# Patient Record
Sex: MALE | Race: WHITE
[De-identification: names, ages, dates, MRNs, and addresses within clinical notes are randomized per-mention and may not be internally consistent; named-entity substitution may affect disease eponyms.]

---

## 2018-09-11 ENCOUNTER — HOSPITAL ENCOUNTER (INPATIENT)
Dept: HOSPITAL 4 - SED | Age: 78
LOS: 3 days | Discharge: NURSING, REHAB OR LONG-TERM CARE | DRG: 190 | End: 2018-09-14
Attending: FAMILY MEDICINE | Admitting: FAMILY MEDICINE
Payer: COMMERCIAL

## 2018-09-11 VITALS — HEIGHT: 68 IN | BODY MASS INDEX: 28.04 KG/M2 | WEIGHT: 185 LBS

## 2018-09-11 VITALS — SYSTOLIC BLOOD PRESSURE: 131 MMHG

## 2018-09-11 VITALS — SYSTOLIC BLOOD PRESSURE: 133 MMHG

## 2018-09-11 DIAGNOSIS — M19.90: ICD-10-CM

## 2018-09-11 DIAGNOSIS — J18.9: ICD-10-CM

## 2018-09-11 DIAGNOSIS — Z87.891: ICD-10-CM

## 2018-09-11 DIAGNOSIS — J44.0: Primary | ICD-10-CM

## 2018-09-11 DIAGNOSIS — Z86.73: ICD-10-CM

## 2018-09-11 DIAGNOSIS — K21.9: ICD-10-CM

## 2018-09-11 DIAGNOSIS — J44.1: ICD-10-CM

## 2018-09-11 DIAGNOSIS — I27.20: ICD-10-CM

## 2018-09-11 DIAGNOSIS — F03.90: ICD-10-CM

## 2018-09-11 LAB
ALBUMIN SERPL BCP-MCNC: 3.4 G/DL (ref 3.4–4.8)
ALT SERPL W P-5'-P-CCNC: 30 U/L (ref 12–78)
ANION GAP SERPL CALCULATED.3IONS-SCNC: 8 MMOL/L (ref 5–15)
AST SERPL W P-5'-P-CCNC: 27 U/L (ref 10–37)
BASOPHILS # BLD AUTO: 0.3 K/UL (ref 0–0.2)
BASOPHILS NFR BLD AUTO: 3.1 % (ref 0–2)
BILIRUB SERPL-MCNC: 1.1 MG/DL (ref 0–1)
BUN SERPL-MCNC: 17 MG/DL (ref 8–21)
CALCIUM SERPL-MCNC: 8.8 MG/DL (ref 8.4–11)
CHLORIDE SERPL-SCNC: 108 MMOL/L (ref 98–107)
CREAT SERPL-MCNC: 1.06 MG/DL (ref 0.55–1.3)
EOSINOPHIL # BLD AUTO: 0.2 K/UL (ref 0–0.4)
EOSINOPHIL NFR BLD AUTO: 2.6 % (ref 0–4)
ERYTHROCYTE [DISTWIDTH] IN BLOOD BY AUTOMATED COUNT: 14.4 % (ref 9–15)
GFR SERPL CREATININE-BSD FRML MDRD: (no result) ML/MIN (ref 90–?)
GLUCOSE SERPL-MCNC: 108 MG/DL (ref 70–99)
HCT VFR BLD AUTO: 42.2 % (ref 36–54)
HGB BLD-MCNC: 14 G/DL (ref 14–18)
LYMPHOCYTES # BLD AUTO: 1.4 K/UL (ref 1–5.5)
LYMPHOCYTES NFR BLD AUTO: 17.2 % (ref 20.5–51.5)
MCH RBC QN AUTO: 32 PG (ref 27–31)
MCHC RBC AUTO-ENTMCNC: 33 % (ref 32–36)
MCV RBC AUTO: 96 FL (ref 79–98)
MONOCYTES # BLD MANUAL: 0.7 K/UL (ref 0–1)
MONOCYTES # BLD MANUAL: 8.7 % (ref 1.7–9.3)
NEUTROPHILS # BLD AUTO: 5.5 K/UL (ref 1.8–7.7)
NEUTROPHILS NFR BLD AUTO: 68.4 % (ref 40–70)
PLATELET # BLD AUTO: 160 K/UL (ref 130–430)
POTASSIUM SERPL-SCNC: 3.9 MMOL/L (ref 3.5–5.1)
RBC # BLD AUTO: 4.38 MIL/UL (ref 4.2–6.2)
SODIUM SERPLBLD-SCNC: 141 MMOL/L (ref 136–145)
WBC # BLD AUTO: 8.1 K/UL (ref 4.8–10.8)

## 2018-09-11 RX ADMIN — SODIUM CHLORIDE SCH MLS/HR: 0.9 INJECTION, SOLUTION INTRAVENOUS at 21:27

## 2018-09-11 RX ADMIN — LEVALBUTEROL SCH MG: 1.25 SOLUTION, CONCENTRATE RESPIRATORY (INHALATION) at 23:07

## 2018-09-11 RX ADMIN — DEXTROSE AND SODIUM CHLORIDE SCH MLS/HR: 5; 450 INJECTION, SOLUTION INTRAVENOUS at 20:25

## 2018-09-11 RX ADMIN — DEXTROSE MONOHYDRATE SCH MLS/HR: 50 INJECTION, SOLUTION INTRAVENOUS at 20:25

## 2018-09-11 RX ADMIN — MEMANTINE HYDROCHLORIDE SCH MG: 5 TABLET ORAL at 20:34

## 2018-09-12 VITALS — SYSTOLIC BLOOD PRESSURE: 130 MMHG

## 2018-09-12 VITALS — SYSTOLIC BLOOD PRESSURE: 136 MMHG

## 2018-09-12 VITALS — SYSTOLIC BLOOD PRESSURE: 128 MMHG

## 2018-09-12 VITALS — SYSTOLIC BLOOD PRESSURE: 113 MMHG

## 2018-09-12 RX ADMIN — LEVALBUTEROL SCH MG: 1.25 SOLUTION, CONCENTRATE RESPIRATORY (INHALATION) at 07:27

## 2018-09-12 RX ADMIN — ENOXAPARIN SODIUM SCH MG: 40 INJECTION SUBCUTANEOUS at 09:39

## 2018-09-12 RX ADMIN — DEXTROSE MONOHYDRATE SCH MLS/HR: 50 INJECTION, SOLUTION INTRAVENOUS at 20:45

## 2018-09-12 RX ADMIN — DEXTROSE AND SODIUM CHLORIDE SCH MLS/HR: 5; 450 INJECTION, SOLUTION INTRAVENOUS at 09:30

## 2018-09-12 RX ADMIN — SODIUM CHLORIDE SCH MLS/HR: 0.9 INJECTION, SOLUTION INTRAVENOUS at 22:21

## 2018-09-12 RX ADMIN — Medication SCH MG: at 02:56

## 2018-09-12 RX ADMIN — Medication SCH MG: at 09:33

## 2018-09-12 RX ADMIN — MEMANTINE HYDROCHLORIDE SCH MG: 5 TABLET ORAL at 20:45

## 2018-09-12 RX ADMIN — FAMOTIDINE SCH MG: 20 TABLET, FILM COATED ORAL at 09:32

## 2018-09-12 RX ADMIN — Medication SCH MG: at 20:45

## 2018-09-12 RX ADMIN — LEVALBUTEROL SCH MG: 1.25 SOLUTION, CONCENTRATE RESPIRATORY (INHALATION) at 22:50

## 2018-09-12 RX ADMIN — LEVALBUTEROL SCH MG: 1.25 SOLUTION, CONCENTRATE RESPIRATORY (INHALATION) at 15:16

## 2018-09-12 RX ADMIN — MEMANTINE HYDROCHLORIDE SCH MG: 5 TABLET ORAL at 09:32

## 2018-09-13 VITALS — SYSTOLIC BLOOD PRESSURE: 119 MMHG

## 2018-09-13 VITALS — SYSTOLIC BLOOD PRESSURE: 135 MMHG

## 2018-09-13 VITALS — SYSTOLIC BLOOD PRESSURE: 141 MMHG

## 2018-09-13 VITALS — SYSTOLIC BLOOD PRESSURE: 144 MMHG

## 2018-09-13 VITALS — SYSTOLIC BLOOD PRESSURE: 138 MMHG

## 2018-09-13 RX ADMIN — Medication SCH MG: at 09:05

## 2018-09-13 RX ADMIN — DEXTROSE AND SODIUM CHLORIDE SCH MLS/HR: 5; 450 INJECTION, SOLUTION INTRAVENOUS at 02:44

## 2018-09-13 RX ADMIN — ENOXAPARIN SODIUM SCH MG: 40 INJECTION SUBCUTANEOUS at 09:09

## 2018-09-13 RX ADMIN — MEMANTINE HYDROCHLORIDE SCH MG: 5 TABLET ORAL at 09:05

## 2018-09-13 RX ADMIN — LEVALBUTEROL SCH MG: 1.25 SOLUTION, CONCENTRATE RESPIRATORY (INHALATION) at 23:27

## 2018-09-13 RX ADMIN — LEVALBUTEROL SCH MG: 1.25 SOLUTION, CONCENTRATE RESPIRATORY (INHALATION) at 15:23

## 2018-09-13 RX ADMIN — LEVALBUTEROL SCH MG: 1.25 SOLUTION, CONCENTRATE RESPIRATORY (INHALATION) at 07:23

## 2018-09-13 RX ADMIN — FAMOTIDINE SCH MG: 20 TABLET, FILM COATED ORAL at 09:05

## 2018-09-13 RX ADMIN — MEMANTINE HYDROCHLORIDE SCH MG: 5 TABLET ORAL at 20:36

## 2018-09-13 RX ADMIN — Medication SCH MG: at 20:36

## 2018-09-13 RX ADMIN — SODIUM CHLORIDE SCH MLS/HR: 0.9 INJECTION, SOLUTION INTRAVENOUS at 20:40

## 2018-09-13 RX ADMIN — DEXTROSE MONOHYDRATE SCH MLS/HR: 50 INJECTION, SOLUTION INTRAVENOUS at 20:37

## 2018-09-13 RX ADMIN — DEXTROSE AND SODIUM CHLORIDE SCH MLS/HR: 5; 450 INJECTION, SOLUTION INTRAVENOUS at 15:21

## 2018-09-14 VITALS — SYSTOLIC BLOOD PRESSURE: 115 MMHG

## 2018-09-14 VITALS — SYSTOLIC BLOOD PRESSURE: 117 MMHG

## 2018-09-14 LAB
ALBUMIN SERPL BCP-MCNC: 2.4 G/DL (ref 3.4–4.8)
ALT SERPL W P-5'-P-CCNC: 19 U/L (ref 12–78)
ANION GAP SERPL CALCULATED.3IONS-SCNC: 6 MMOL/L (ref 5–15)
AST SERPL W P-5'-P-CCNC: 16 U/L (ref 10–37)
BASOPHILS # BLD AUTO: 0 K/UL (ref 0–0.2)
BASOPHILS NFR BLD AUTO: 0.6 % (ref 0–2)
BILIRUB SERPL-MCNC: 0.8 MG/DL (ref 0–1)
BUN SERPL-MCNC: 8 MG/DL (ref 8–21)
CALCIUM SERPL-MCNC: 8.1 MG/DL (ref 8.4–11)
CHLORIDE SERPL-SCNC: 106 MMOL/L (ref 98–107)
CREAT SERPL-MCNC: 0.8 MG/DL (ref 0.55–1.3)
EOSINOPHIL # BLD AUTO: 0.4 K/UL (ref 0–0.4)
EOSINOPHIL NFR BLD AUTO: 6 % (ref 0–4)
ERYTHROCYTE [DISTWIDTH] IN BLOOD BY AUTOMATED COUNT: 14.1 % (ref 9–15)
GFR SERPL CREATININE-BSD FRML MDRD: (no result) ML/MIN (ref 90–?)
GLUCOSE SERPL-MCNC: 95 MG/DL (ref 70–99)
HCT VFR BLD AUTO: 37.6 % (ref 36–54)
HGB BLD-MCNC: 12.8 G/DL (ref 14–18)
LYMPHOCYTES # BLD AUTO: 1.5 K/UL (ref 1–5.5)
LYMPHOCYTES NFR BLD AUTO: 19.7 % (ref 20.5–51.5)
MCH RBC QN AUTO: 33 PG (ref 27–31)
MCHC RBC AUTO-ENTMCNC: 34 % (ref 32–36)
MCV RBC AUTO: 96 FL (ref 79–98)
MONOCYTES # BLD MANUAL: 0.7 K/UL (ref 0–1)
MONOCYTES # BLD MANUAL: 9.2 % (ref 1.7–9.3)
NEUTROPHILS # BLD AUTO: 4.8 K/UL (ref 1.8–7.7)
NEUTROPHILS NFR BLD AUTO: 64.5 % (ref 40–70)
PLATELET # BLD AUTO: 156 K/UL (ref 130–430)
POTASSIUM SERPL-SCNC: 3.3 MMOL/L (ref 3.5–5.1)
RBC # BLD AUTO: 3.92 MIL/UL (ref 4.2–6.2)
SODIUM SERPLBLD-SCNC: 138 MMOL/L (ref 136–145)
WBC # BLD AUTO: 7.4 K/UL (ref 4.8–10.8)

## 2018-09-14 RX ADMIN — FAMOTIDINE SCH MG: 20 TABLET, FILM COATED ORAL at 09:19

## 2018-09-14 RX ADMIN — DEXTROSE AND SODIUM CHLORIDE SCH MLS/HR: 5; 450 INJECTION, SOLUTION INTRAVENOUS at 04:07

## 2018-09-14 RX ADMIN — DEXTROSE AND SODIUM CHLORIDE SCH MLS/HR: 5; 450 INJECTION, SOLUTION INTRAVENOUS at 14:10

## 2018-09-14 RX ADMIN — Medication SCH MG: at 09:20

## 2018-09-14 RX ADMIN — MEMANTINE HYDROCHLORIDE SCH MG: 5 TABLET ORAL at 09:19

## 2018-09-14 RX ADMIN — ENOXAPARIN SODIUM SCH MG: 40 INJECTION SUBCUTANEOUS at 09:20

## 2018-09-14 RX ADMIN — LEVALBUTEROL SCH MG: 1.25 SOLUTION, CONCENTRATE RESPIRATORY (INHALATION) at 07:37

## 2018-09-14 RX ADMIN — LEVALBUTEROL SCH MG: 1.25 SOLUTION, CONCENTRATE RESPIRATORY (INHALATION) at 17:01

## 2019-06-04 ENCOUNTER — HOSPITAL ENCOUNTER (INPATIENT)
Dept: HOSPITAL 4 - SED | Age: 79
LOS: 7 days | Discharge: NURSING, REHAB OR LONG-TERM CARE | DRG: 871 | End: 2019-06-11
Attending: FAMILY MEDICINE | Admitting: FAMILY MEDICINE
Payer: COMMERCIAL

## 2019-06-04 VITALS — SYSTOLIC BLOOD PRESSURE: 116 MMHG

## 2019-06-04 VITALS — BODY MASS INDEX: 26.82 KG/M2 | HEIGHT: 72 IN | WEIGHT: 198 LBS

## 2019-06-04 VITALS — SYSTOLIC BLOOD PRESSURE: 94 MMHG

## 2019-06-04 VITALS — SYSTOLIC BLOOD PRESSURE: 105 MMHG

## 2019-06-04 VITALS — SYSTOLIC BLOOD PRESSURE: 118 MMHG

## 2019-06-04 VITALS — SYSTOLIC BLOOD PRESSURE: 126 MMHG

## 2019-06-04 VITALS — SYSTOLIC BLOOD PRESSURE: 106 MMHG

## 2019-06-04 VITALS — SYSTOLIC BLOOD PRESSURE: 114 MMHG

## 2019-06-04 VITALS — SYSTOLIC BLOOD PRESSURE: 104 MMHG

## 2019-06-04 VITALS — SYSTOLIC BLOOD PRESSURE: 103 MMHG

## 2019-06-04 DIAGNOSIS — E43: ICD-10-CM

## 2019-06-04 DIAGNOSIS — Z79.82: ICD-10-CM

## 2019-06-04 DIAGNOSIS — N40.1: ICD-10-CM

## 2019-06-04 DIAGNOSIS — D64.9: ICD-10-CM

## 2019-06-04 DIAGNOSIS — J96.01: ICD-10-CM

## 2019-06-04 DIAGNOSIS — Z74.01: ICD-10-CM

## 2019-06-04 DIAGNOSIS — I25.2: ICD-10-CM

## 2019-06-04 DIAGNOSIS — F03.90: ICD-10-CM

## 2019-06-04 DIAGNOSIS — A41.9: Primary | ICD-10-CM

## 2019-06-04 DIAGNOSIS — I47.1: ICD-10-CM

## 2019-06-04 DIAGNOSIS — B96.5: ICD-10-CM

## 2019-06-04 DIAGNOSIS — Z79.899: ICD-10-CM

## 2019-06-04 DIAGNOSIS — I10: ICD-10-CM

## 2019-06-04 DIAGNOSIS — R13.10: ICD-10-CM

## 2019-06-04 DIAGNOSIS — F32.9: ICD-10-CM

## 2019-06-04 DIAGNOSIS — E87.2: ICD-10-CM

## 2019-06-04 DIAGNOSIS — E86.0: ICD-10-CM

## 2019-06-04 DIAGNOSIS — L89.159: ICD-10-CM

## 2019-06-04 DIAGNOSIS — Z86.73: ICD-10-CM

## 2019-06-04 DIAGNOSIS — G93.41: ICD-10-CM

## 2019-06-04 DIAGNOSIS — R65.21: ICD-10-CM

## 2019-06-04 DIAGNOSIS — I25.10: ICD-10-CM

## 2019-06-04 DIAGNOSIS — J69.0: ICD-10-CM

## 2019-06-04 DIAGNOSIS — N39.0: ICD-10-CM

## 2019-06-04 LAB
ALBUMIN SERPL BCP-MCNC: 1.7 G/DL (ref 3.4–4.8)
ALT SERPL W P-5'-P-CCNC: 74 U/L (ref 12–78)
ANION GAP SERPL CALCULATED.3IONS-SCNC: 9 MMOL/L (ref 5–15)
APPEARANCE UR: (no result)
AST SERPL W P-5'-P-CCNC: 48 U/L (ref 10–37)
BACTERIA URNS QL MICRO: (no result) /HPF
BASOPHILS NFR BLD MANUAL: 0 % (ref 0–2)
BILIRUB SERPL-MCNC: 0.6 MG/DL (ref 0–1)
BILIRUB UR QL STRIP: (no result)
BUN SERPL-MCNC: 32 MG/DL (ref 8–21)
CALCIUM SERPL-MCNC: 8 MG/DL (ref 8.4–11)
CHLORIDE SERPL-SCNC: 102 MMOL/L (ref 98–107)
COARSE GRAN CASTS URNS QL MICRO: (no result) /LPF
COLOR UR: YELLOW
CREAT SERPL-MCNC: 1.18 MG/DL (ref 0.55–1.3)
EOSINOPHIL NFR BLD MANUAL: 0 % (ref 0–7)
ERYTHROCYTE [DISTWIDTH] IN BLOOD BY AUTOMATED COUNT: 19 % (ref 9–15)
GFR SERPL CREATININE-BSD FRML MDRD: (no result) ML/MIN (ref 90–?)
GLUCOSE SERPL-MCNC: 106 MG/DL (ref 70–99)
GLUCOSE UR STRIP-MCNC: NEGATIVE MG/DL
HCT VFR BLD AUTO: 32.3 % (ref 36–54)
HGB BLD-MCNC: 10.6 G/DL (ref 14–18)
HGB UR QL STRIP: (no result)
KETONES UR STRIP-MCNC: (no result) MG/DL
LEUKOCYTE ESTERASE UR QL STRIP: (no result)
LYMPHOCYTES NFR BLD MANUAL: 6 % (ref 20–46)
MCH RBC QN AUTO: 32 PG (ref 27–31)
MCHC RBC AUTO-ENTMCNC: 33 % (ref 32–36)
MCV RBC AUTO: 96 FL (ref 79–98)
MONOCYTES # BLD MANUAL: 9 % (ref 0–11)
NEUTS BAND NFR BLD MANUAL: 11 % (ref 0–6)
NITRITE UR QL STRIP: NEGATIVE
PH UR STRIP: 6.5 [PH] (ref 5–8)
PLATELET # BLD AUTO: 135 K/UL (ref 130–430)
POTASSIUM SERPL-SCNC: 3.9 MMOL/L (ref 3.5–5.1)
PROT UR QL STRIP: (no result)
RBC # BLD AUTO: 3.36 MIL/UL (ref 4.2–6.2)
RBC #/AREA URNS HPF: (no result) /HPF (ref 0–3)
SODIUM SERPLBLD-SCNC: 135 MMOL/L (ref 136–145)
SP GR UR STRIP: 1 (ref 1–1.03)
UROBILINOGEN UR STRIP-MCNC: 1 MG/DL (ref 0.2–1)
VARIANT LYMPHS NFR BLD MANUAL: 1 % (ref 0–0)
WBC # BLD AUTO: 19.5 K/UL (ref 4.8–10.8)
WBC #/AREA URNS HPF: (no result) /HPF (ref 0–3)

## 2019-06-04 PROCEDURE — 5A09357 ASSISTANCE WITH RESPIRATORY VENTILATION, LESS THAN 24 CONSECUTIVE HOURS, CONTINUOUS POSITIVE AIRWAY PRESSURE: ICD-10-PCS | Performed by: FAMILY MEDICINE

## 2019-06-04 PROCEDURE — P9046 ALBUMIN (HUMAN), 25%, 20 ML: HCPCS

## 2019-06-04 RX ADMIN — LEVALBUTEROL SCH MG: 1.25 SOLUTION, CONCENTRATE RESPIRATORY (INHALATION) at 19:46

## 2019-06-04 RX ADMIN — DOCUSATE SODIUM LIQUID SCH MG: 100 LIQUID ORAL at 20:33

## 2019-06-04 RX ADMIN — DEXTROSE AND SODIUM CHLORIDE SCH MLS/HR: 5; 900 INJECTION, SOLUTION INTRAVENOUS at 18:25

## 2019-06-04 RX ADMIN — OXYCODONE HYDROCHLORIDE AND ACETAMINOPHEN SCH MG: 500 TABLET ORAL at 20:35

## 2019-06-04 RX ADMIN — DEXTROSE MONOHYDRATE SCH MLS/HR: 50 INJECTION, SOLUTION INTRAVENOUS at 23:02

## 2019-06-04 RX ADMIN — ENOXAPARIN SODIUM SCH MG: 40 INJECTION SUBCUTANEOUS at 20:36

## 2019-06-04 RX ADMIN — ALBUMIN (HUMAN) SCH MLS/HR: 5 SOLUTION INTRAVENOUS at 23:01

## 2019-06-04 RX ADMIN — METHYLPREDNISOLONE SODIUM SUCCINATE SCH MG: 125 INJECTION, POWDER, FOR SOLUTION INTRAMUSCULAR; INTRAVENOUS at 18:23

## 2019-06-04 RX ADMIN — SODIUM CHLORIDE SCH MLS/HR: 0.9 INJECTION, SOLUTION INTRAVENOUS at 20:31

## 2019-06-04 RX ADMIN — SODIUM CHLORIDE SCH MLS/HR: 0.9 INJECTION, SOLUTION INTRAVENOUS at 20:17

## 2019-06-04 RX ADMIN — Medication SCH MG: at 20:35

## 2019-06-04 RX ADMIN — ATORVASTATIN CALCIUM SCH MG: 20 TABLET, FILM COATED ORAL at 20:34

## 2019-06-04 RX ADMIN — DEXTROSE AND SODIUM CHLORIDE SCH MLS/HR: 5; 900 INJECTION, SOLUTION INTRAVENOUS at 23:00

## 2019-06-05 VITALS — SYSTOLIC BLOOD PRESSURE: 98 MMHG

## 2019-06-05 VITALS — SYSTOLIC BLOOD PRESSURE: 106 MMHG

## 2019-06-05 VITALS — SYSTOLIC BLOOD PRESSURE: 103 MMHG

## 2019-06-05 VITALS — SYSTOLIC BLOOD PRESSURE: 104 MMHG

## 2019-06-05 VITALS — SYSTOLIC BLOOD PRESSURE: 90 MMHG

## 2019-06-05 VITALS — SYSTOLIC BLOOD PRESSURE: 88 MMHG

## 2019-06-05 VITALS — SYSTOLIC BLOOD PRESSURE: 115 MMHG

## 2019-06-05 VITALS — SYSTOLIC BLOOD PRESSURE: 116 MMHG

## 2019-06-05 VITALS — SYSTOLIC BLOOD PRESSURE: 102 MMHG

## 2019-06-05 VITALS — SYSTOLIC BLOOD PRESSURE: 107 MMHG

## 2019-06-05 VITALS — SYSTOLIC BLOOD PRESSURE: 105 MMHG

## 2019-06-05 VITALS — SYSTOLIC BLOOD PRESSURE: 93 MMHG

## 2019-06-05 VITALS — SYSTOLIC BLOOD PRESSURE: 117 MMHG

## 2019-06-05 VITALS — SYSTOLIC BLOOD PRESSURE: 99 MMHG

## 2019-06-05 VITALS — SYSTOLIC BLOOD PRESSURE: 111 MMHG

## 2019-06-05 LAB
ANION GAP SERPL CALCULATED.3IONS-SCNC: 12 MMOL/L (ref 5–15)
BASOPHILS NFR BLD MANUAL: 0 % (ref 0–2)
BUN SERPL-MCNC: 22 MG/DL (ref 8–21)
CALCIUM SERPL-MCNC: 7.5 MG/DL (ref 8.4–11)
CHLORIDE SERPL-SCNC: 109 MMOL/L (ref 98–107)
CREAT SERPL-MCNC: 0.77 MG/DL (ref 0.55–1.3)
EOSINOPHIL NFR BLD MANUAL: 0 % (ref 0–7)
ERYTHROCYTE [DISTWIDTH] IN BLOOD BY AUTOMATED COUNT: 18.7 % (ref 9–15)
GFR SERPL CREATININE-BSD FRML MDRD: (no result) ML/MIN (ref 90–?)
GLUCOSE SERPL-MCNC: 206 MG/DL (ref 70–99)
HCT VFR BLD AUTO: 28 % (ref 36–54)
HGB BLD-MCNC: 9.2 G/DL (ref 14–18)
LYMPHOCYTES NFR BLD MANUAL: 5 % (ref 20–46)
MCH RBC QN AUTO: 31 PG (ref 27–31)
MCHC RBC AUTO-ENTMCNC: 33 % (ref 32–36)
MCV RBC AUTO: 96 FL (ref 79–98)
MONOCYTES # BLD MANUAL: 0 % (ref 0–11)
NEUTS BAND NFR BLD MANUAL: 4 % (ref 0–6)
PLATELET # BLD AUTO: 134 K/UL (ref 130–430)
POTASSIUM SERPL-SCNC: 3.2 MMOL/L (ref 3.5–5.1)
RBC # BLD AUTO: 2.93 MIL/UL (ref 4.2–6.2)
SODIUM SERPLBLD-SCNC: 143 MMOL/L (ref 136–145)
WBC # BLD AUTO: 22.3 K/UL (ref 4.8–10.8)

## 2019-06-05 PROCEDURE — 5A09357 ASSISTANCE WITH RESPIRATORY VENTILATION, LESS THAN 24 CONSECUTIVE HOURS, CONTINUOUS POSITIVE AIRWAY PRESSURE: ICD-10-PCS | Performed by: FAMILY MEDICINE

## 2019-06-05 RX ADMIN — Medication SCH CAP: at 08:11

## 2019-06-05 RX ADMIN — MULTIVITAMIN SCH ML: LIQUID ORAL at 08:17

## 2019-06-05 RX ADMIN — FAMOTIDINE SCH MG: 20 TABLET, FILM COATED ORAL at 21:10

## 2019-06-05 RX ADMIN — SODIUM CHLORIDE SCH MLS/HR: 0.9 INJECTION, SOLUTION INTRAVENOUS at 11:03

## 2019-06-05 RX ADMIN — SODIUM CHLORIDE SCH MLS/HR: 9 INJECTION, SOLUTION INTRAVENOUS at 14:11

## 2019-06-05 RX ADMIN — ALBUMIN (HUMAN) SCH MLS/HR: 5 SOLUTION INTRAVENOUS at 03:58

## 2019-06-05 RX ADMIN — DOCUSATE SODIUM LIQUID SCH MG: 100 LIQUID ORAL at 21:10

## 2019-06-05 RX ADMIN — Medication SCH MG: at 21:00

## 2019-06-05 RX ADMIN — SODIUM CHLORIDE SCH MLS/HR: 9 INJECTION, SOLUTION INTRAVENOUS at 21:17

## 2019-06-05 RX ADMIN — DEXTROSE MONOHYDRATE SCH MLS/HR: 50 INJECTION, SOLUTION INTRAVENOUS at 05:41

## 2019-06-05 RX ADMIN — IPRATROPIUM BROMIDE SCH MG: 0.5 SOLUTION RESPIRATORY (INHALATION) at 19:45

## 2019-06-05 RX ADMIN — FAMOTIDINE SCH MG: 20 TABLET, FILM COATED ORAL at 08:11

## 2019-06-05 RX ADMIN — Medication SCH MG: at 08:14

## 2019-06-05 RX ADMIN — LEVALBUTEROL SCH MG: 1.25 SOLUTION, CONCENTRATE RESPIRATORY (INHALATION) at 00:45

## 2019-06-05 RX ADMIN — DEXTROSE MONOHYDRATE SCH MLS/HR: 50 INJECTION, SOLUTION INTRAVENOUS at 21:11

## 2019-06-05 RX ADMIN — LEVALBUTEROL SCH MG: 1.25 SOLUTION, CONCENTRATE RESPIRATORY (INHALATION) at 07:28

## 2019-06-05 RX ADMIN — Medication SCH MG: at 08:11

## 2019-06-05 RX ADMIN — HYDROCORTISONE SODIUM SUCCINATE SCH MG: 100 INJECTION, POWDER, FOR SOLUTION INTRAMUSCULAR; INTRAVENOUS at 21:10

## 2019-06-05 RX ADMIN — ALBUMIN (HUMAN) SCH MLS/HR: 5 SOLUTION INTRAVENOUS at 08:17

## 2019-06-05 RX ADMIN — METHYLPREDNISOLONE SODIUM SUCCINATE SCH MG: 125 INJECTION, POWDER, FOR SOLUTION INTRAMUSCULAR; INTRAVENOUS at 00:03

## 2019-06-05 RX ADMIN — ENOXAPARIN SODIUM SCH MG: 40 INJECTION SUBCUTANEOUS at 21:15

## 2019-06-05 RX ADMIN — HYDROCORTISONE SODIUM SUCCINATE SCH MG: 100 INJECTION, POWDER, FOR SOLUTION INTRAMUSCULAR; INTRAVENOUS at 14:05

## 2019-06-05 RX ADMIN — SODIUM CHLORIDE SCH MLS/HR: 0.9 INJECTION, SOLUTION INTRAVENOUS at 21:10

## 2019-06-05 RX ADMIN — LEVALBUTEROL SCH MG: 1.25 SOLUTION, CONCENTRATE RESPIRATORY (INHALATION) at 13:17

## 2019-06-05 RX ADMIN — SODIUM CHLORIDE SCH MLS/HR: 9 INJECTION, SOLUTION INTRAVENOUS at 02:36

## 2019-06-05 RX ADMIN — DEXTROSE MONOHYDRATE SCH MLS/HR: 50 INJECTION, SOLUTION INTRAVENOUS at 14:04

## 2019-06-05 RX ADMIN — ATORVASTATIN CALCIUM SCH MG: 20 TABLET, FILM COATED ORAL at 21:10

## 2019-06-05 RX ADMIN — DOCUSATE SODIUM LIQUID SCH MG: 100 LIQUID ORAL at 08:15

## 2019-06-05 RX ADMIN — METHYLPREDNISOLONE SODIUM SUCCINATE SCH MG: 125 INJECTION, POWDER, FOR SOLUTION INTRAMUSCULAR; INTRAVENOUS at 05:41

## 2019-06-05 RX ADMIN — OXYCODONE HYDROCHLORIDE AND ACETAMINOPHEN SCH MG: 500 TABLET ORAL at 21:10

## 2019-06-05 RX ADMIN — SODIUM CHLORIDE SCH MLS/HR: 0.9 INJECTION, SOLUTION INTRAVENOUS at 15:07

## 2019-06-05 RX ADMIN — SODIUM CHLORIDE SCH MLS/HR: 9 INJECTION, SOLUTION INTRAVENOUS at 10:32

## 2019-06-05 RX ADMIN — LEVALBUTEROL SCH MG: 1.25 SOLUTION, CONCENTRATE RESPIRATORY (INHALATION) at 19:45

## 2019-06-05 RX ADMIN — SODIUM CHLORIDE SCH MLS/HR: 0.9 INJECTION, SOLUTION INTRAVENOUS at 01:58

## 2019-06-05 RX ADMIN — OXYCODONE HYDROCHLORIDE AND ACETAMINOPHEN SCH MG: 500 TABLET ORAL at 08:11

## 2019-06-06 VITALS — SYSTOLIC BLOOD PRESSURE: 119 MMHG

## 2019-06-06 VITALS — SYSTOLIC BLOOD PRESSURE: 101 MMHG

## 2019-06-06 VITALS — SYSTOLIC BLOOD PRESSURE: 113 MMHG

## 2019-06-06 VITALS — SYSTOLIC BLOOD PRESSURE: 106 MMHG

## 2019-06-06 VITALS — SYSTOLIC BLOOD PRESSURE: 118 MMHG

## 2019-06-06 VITALS — SYSTOLIC BLOOD PRESSURE: 99 MMHG

## 2019-06-06 VITALS — SYSTOLIC BLOOD PRESSURE: 108 MMHG

## 2019-06-06 VITALS — SYSTOLIC BLOOD PRESSURE: 128 MMHG

## 2019-06-06 VITALS — SYSTOLIC BLOOD PRESSURE: 111 MMHG

## 2019-06-06 VITALS — SYSTOLIC BLOOD PRESSURE: 126 MMHG

## 2019-06-06 VITALS — SYSTOLIC BLOOD PRESSURE: 115 MMHG

## 2019-06-06 VITALS — SYSTOLIC BLOOD PRESSURE: 104 MMHG

## 2019-06-06 VITALS — SYSTOLIC BLOOD PRESSURE: 112 MMHG

## 2019-06-06 VITALS — SYSTOLIC BLOOD PRESSURE: 89 MMHG

## 2019-06-06 VITALS — SYSTOLIC BLOOD PRESSURE: 110 MMHG

## 2019-06-06 VITALS — SYSTOLIC BLOOD PRESSURE: 109 MMHG

## 2019-06-06 VITALS — SYSTOLIC BLOOD PRESSURE: 107 MMHG

## 2019-06-06 VITALS — SYSTOLIC BLOOD PRESSURE: 100 MMHG

## 2019-06-06 LAB
ALBUMIN SERPL BCP-MCNC: 1.8 G/DL (ref 3.4–4.8)
ALT SERPL W P-5'-P-CCNC: 55 U/L (ref 12–78)
ANION GAP SERPL CALCULATED.3IONS-SCNC: 13 MMOL/L (ref 5–15)
AST SERPL W P-5'-P-CCNC: 21 U/L (ref 10–37)
BASOPHILS # BLD AUTO: 0 K/UL (ref 0–0.2)
BASOPHILS NFR BLD AUTO: 0.1 % (ref 0–2)
BILIRUB SERPL-MCNC: 0.4 MG/DL (ref 0–1)
BUN SERPL-MCNC: 26 MG/DL (ref 8–21)
CALCIUM SERPL-MCNC: 7.9 MG/DL (ref 8.4–11)
CHLORIDE SERPL-SCNC: 112 MMOL/L (ref 98–107)
CREAT SERPL-MCNC: 0.77 MG/DL (ref 0.55–1.3)
EOSINOPHIL # BLD AUTO: 0 K/UL (ref 0–0.4)
EOSINOPHIL NFR BLD AUTO: 0 % (ref 0–4)
ERYTHROCYTE [DISTWIDTH] IN BLOOD BY AUTOMATED COUNT: 18.5 % (ref 9–15)
GFR SERPL CREATININE-BSD FRML MDRD: (no result) ML/MIN (ref 90–?)
GLUCOSE SERPL-MCNC: 148 MG/DL (ref 70–99)
HCT VFR BLD AUTO: 26.9 % (ref 36–54)
HGB BLD-MCNC: 8.8 G/DL (ref 14–18)
LYMPHOCYTES # BLD AUTO: 1.1 K/UL (ref 1–5.5)
LYMPHOCYTES NFR BLD AUTO: 4.3 % (ref 20.5–51.5)
MCH RBC QN AUTO: 32 PG (ref 27–31)
MCHC RBC AUTO-ENTMCNC: 33 % (ref 32–36)
MCV RBC AUTO: 96 FL (ref 79–98)
MONOCYTES # BLD MANUAL: 0.9 K/UL (ref 0–1)
MONOCYTES # BLD MANUAL: 3.6 % (ref 1.7–9.3)
NEUTROPHILS # BLD AUTO: 23.9 K/UL (ref 1.8–7.7)
NEUTROPHILS NFR BLD AUTO: 92 % (ref 40–70)
PHOSPHATE SERPL-MCNC: 2.1 MG/DL (ref 2.7–4.5)
PLATELET # BLD AUTO: 145 K/UL (ref 130–430)
POTASSIUM SERPL-SCNC: 2.9 MMOL/L (ref 3.5–5.1)
RBC # BLD AUTO: 2.8 MIL/UL (ref 4.2–6.2)
SODIUM SERPLBLD-SCNC: 145 MMOL/L (ref 136–145)
WBC # BLD AUTO: 26 K/UL (ref 4.8–10.8)

## 2019-06-06 RX ADMIN — SODIUM CHLORIDE SCH MLS/HR: 0.9 INJECTION, SOLUTION INTRAVENOUS at 21:45

## 2019-06-06 RX ADMIN — IPRATROPIUM BROMIDE SCH MG: 0.5 SOLUTION RESPIRATORY (INHALATION) at 20:13

## 2019-06-06 RX ADMIN — Medication SCH MG: at 21:00

## 2019-06-06 RX ADMIN — IPRATROPIUM BROMIDE SCH MG: 0.5 SOLUTION RESPIRATORY (INHALATION) at 13:28

## 2019-06-06 RX ADMIN — FAMOTIDINE SCH MG: 20 TABLET, FILM COATED ORAL at 08:34

## 2019-06-06 RX ADMIN — POTASSIUM CHLORIDE SCH MLS/HR: 200 INJECTION, SOLUTION INTRAVENOUS at 10:29

## 2019-06-06 RX ADMIN — DEXTROSE MONOHYDRATE SCH MLS/HR: 50 INJECTION, SOLUTION INTRAVENOUS at 14:51

## 2019-06-06 RX ADMIN — MULTIVITAMIN SCH ML: LIQUID ORAL at 08:35

## 2019-06-06 RX ADMIN — Medication SCH MG: at 08:35

## 2019-06-06 RX ADMIN — LEVALBUTEROL SCH MG: 1.25 SOLUTION, CONCENTRATE RESPIRATORY (INHALATION) at 20:11

## 2019-06-06 RX ADMIN — Medication SCH CAP: at 08:35

## 2019-06-06 RX ADMIN — SODIUM CHLORIDE SCH MLS/HR: 0.9 INJECTION, SOLUTION INTRAVENOUS at 08:54

## 2019-06-06 RX ADMIN — POTASSIUM CHLORIDE SCH MLS/HR: 200 INJECTION, SOLUTION INTRAVENOUS at 08:28

## 2019-06-06 RX ADMIN — DOCUSATE SODIUM LIQUID SCH MG: 100 LIQUID ORAL at 08:35

## 2019-06-06 RX ADMIN — LEVALBUTEROL SCH MG: 1.25 SOLUTION, CONCENTRATE RESPIRATORY (INHALATION) at 07:32

## 2019-06-06 RX ADMIN — HYDROCORTISONE SODIUM SUCCINATE SCH MG: 100 INJECTION, POWDER, FOR SOLUTION INTRAMUSCULAR; INTRAVENOUS at 21:40

## 2019-06-06 RX ADMIN — ATORVASTATIN CALCIUM SCH MG: 20 TABLET, FILM COATED ORAL at 21:40

## 2019-06-06 RX ADMIN — DOCUSATE SODIUM LIQUID SCH MG: 100 LIQUID ORAL at 21:40

## 2019-06-06 RX ADMIN — FAMOTIDINE SCH MG: 20 TABLET, FILM COATED ORAL at 21:40

## 2019-06-06 RX ADMIN — ENOXAPARIN SODIUM SCH MG: 40 INJECTION SUBCUTANEOUS at 21:42

## 2019-06-06 RX ADMIN — HYDROCORTISONE SODIUM SUCCINATE SCH MG: 100 INJECTION, POWDER, FOR SOLUTION INTRAMUSCULAR; INTRAVENOUS at 05:06

## 2019-06-06 RX ADMIN — SODIUM CHLORIDE SCH MLS/HR: 0.9 INJECTION, SOLUTION INTRAVENOUS at 14:51

## 2019-06-06 RX ADMIN — HYDROCORTISONE SODIUM SUCCINATE SCH MG: 100 INJECTION, POWDER, FOR SOLUTION INTRAMUSCULAR; INTRAVENOUS at 14:51

## 2019-06-06 RX ADMIN — OXYCODONE HYDROCHLORIDE AND ACETAMINOPHEN SCH MG: 500 TABLET ORAL at 08:35

## 2019-06-06 RX ADMIN — SODIUM CHLORIDE SCH MLS/HR: 450 INJECTION, SOLUTION INTRAVENOUS at 11:57

## 2019-06-06 RX ADMIN — IPRATROPIUM BROMIDE SCH MG: 0.5 SOLUTION RESPIRATORY (INHALATION) at 00:40

## 2019-06-06 RX ADMIN — LEVALBUTEROL SCH MG: 1.25 SOLUTION, CONCENTRATE RESPIRATORY (INHALATION) at 13:28

## 2019-06-06 RX ADMIN — DEXTROSE MONOHYDRATE SCH MLS/HR: 50 INJECTION, SOLUTION INTRAVENOUS at 21:40

## 2019-06-06 RX ADMIN — OXYCODONE HYDROCHLORIDE AND ACETAMINOPHEN SCH MG: 500 TABLET ORAL at 21:40

## 2019-06-06 RX ADMIN — LEVALBUTEROL SCH MG: 1.25 SOLUTION, CONCENTRATE RESPIRATORY (INHALATION) at 00:40

## 2019-06-06 RX ADMIN — IPRATROPIUM BROMIDE SCH MG: 0.5 SOLUTION RESPIRATORY (INHALATION) at 07:32

## 2019-06-06 RX ADMIN — DEXTROSE MONOHYDRATE SCH MLS/HR: 50 INJECTION, SOLUTION INTRAVENOUS at 05:05

## 2019-06-07 VITALS — SYSTOLIC BLOOD PRESSURE: 155 MMHG

## 2019-06-07 VITALS — SYSTOLIC BLOOD PRESSURE: 146 MMHG

## 2019-06-07 VITALS — SYSTOLIC BLOOD PRESSURE: 117 MMHG

## 2019-06-07 VITALS — SYSTOLIC BLOOD PRESSURE: 131 MMHG

## 2019-06-07 VITALS — SYSTOLIC BLOOD PRESSURE: 124 MMHG

## 2019-06-07 VITALS — SYSTOLIC BLOOD PRESSURE: 160 MMHG

## 2019-06-07 VITALS — SYSTOLIC BLOOD PRESSURE: 91 MMHG

## 2019-06-07 VITALS — SYSTOLIC BLOOD PRESSURE: 97 MMHG

## 2019-06-07 VITALS — SYSTOLIC BLOOD PRESSURE: 119 MMHG

## 2019-06-07 VITALS — SYSTOLIC BLOOD PRESSURE: 109 MMHG

## 2019-06-07 VITALS — SYSTOLIC BLOOD PRESSURE: 173 MMHG

## 2019-06-07 VITALS — SYSTOLIC BLOOD PRESSURE: 101 MMHG

## 2019-06-07 VITALS — SYSTOLIC BLOOD PRESSURE: 115 MMHG

## 2019-06-07 VITALS — SYSTOLIC BLOOD PRESSURE: 118 MMHG

## 2019-06-07 VITALS — SYSTOLIC BLOOD PRESSURE: 126 MMHG

## 2019-06-07 VITALS — SYSTOLIC BLOOD PRESSURE: 144 MMHG

## 2019-06-07 VITALS — SYSTOLIC BLOOD PRESSURE: 145 MMHG

## 2019-06-07 VITALS — SYSTOLIC BLOOD PRESSURE: 129 MMHG

## 2019-06-07 VITALS — SYSTOLIC BLOOD PRESSURE: 116 MMHG

## 2019-06-07 VITALS — SYSTOLIC BLOOD PRESSURE: 128 MMHG

## 2019-06-07 VITALS — SYSTOLIC BLOOD PRESSURE: 130 MMHG

## 2019-06-07 VITALS — SYSTOLIC BLOOD PRESSURE: 107 MMHG

## 2019-06-07 VITALS — SYSTOLIC BLOOD PRESSURE: 95 MMHG

## 2019-06-07 VITALS — SYSTOLIC BLOOD PRESSURE: 121 MMHG

## 2019-06-07 LAB
ALBUMIN SERPL BCP-MCNC: 1.8 G/DL (ref 3.4–4.8)
ALT SERPL W P-5'-P-CCNC: 55 U/L (ref 12–78)
ANION GAP SERPL CALCULATED.3IONS-SCNC: 11 MMOL/L (ref 5–15)
AST SERPL W P-5'-P-CCNC: 29 U/L (ref 10–37)
BASOPHILS # BLD AUTO: 0 K/UL (ref 0–0.2)
BASOPHILS NFR BLD AUTO: 0.1 % (ref 0–2)
BILIRUB SERPL-MCNC: 0.4 MG/DL (ref 0–1)
BUN SERPL-MCNC: 29 MG/DL (ref 8–21)
CALCIUM SERPL-MCNC: 7.7 MG/DL (ref 8.4–11)
CHLORIDE SERPL-SCNC: 113 MMOL/L (ref 98–107)
CREAT SERPL-MCNC: 0.74 MG/DL (ref 0.55–1.3)
EOSINOPHIL # BLD AUTO: 0 K/UL (ref 0–0.4)
EOSINOPHIL NFR BLD AUTO: 0 % (ref 0–4)
ERYTHROCYTE [DISTWIDTH] IN BLOOD BY AUTOMATED COUNT: 18.5 % (ref 9–15)
GFR SERPL CREATININE-BSD FRML MDRD: (no result) ML/MIN (ref 90–?)
GLUCOSE SERPL-MCNC: 115 MG/DL (ref 70–99)
HCT VFR BLD AUTO: 26.7 % (ref 36–54)
HGB BLD-MCNC: 8.6 G/DL (ref 14–18)
LYMPHOCYTES # BLD AUTO: 1.2 K/UL (ref 1–5.5)
LYMPHOCYTES NFR BLD AUTO: 6.6 % (ref 20.5–51.5)
MCH RBC QN AUTO: 31 PG (ref 27–31)
MCHC RBC AUTO-ENTMCNC: 32 % (ref 32–36)
MCV RBC AUTO: 95 FL (ref 79–98)
MONOCYTES # BLD MANUAL: 0.5 K/UL (ref 0–1)
MONOCYTES # BLD MANUAL: 2.7 % (ref 1.7–9.3)
NEUTROPHILS # BLD AUTO: 16.7 K/UL (ref 1.8–7.7)
NEUTROPHILS NFR BLD AUTO: 90.6 % (ref 40–70)
PLATELET # BLD AUTO: 121 K/UL (ref 130–430)
POTASSIUM SERPL-SCNC: 3 MMOL/L (ref 3.5–5.1)
RBC # BLD AUTO: 2.8 MIL/UL (ref 4.2–6.2)
SODIUM SERPLBLD-SCNC: 145 MMOL/L (ref 136–145)
WBC # BLD AUTO: 18.4 K/UL (ref 4.8–10.8)

## 2019-06-07 RX ADMIN — IPRATROPIUM BROMIDE SCH MG: 0.5 SOLUTION RESPIRATORY (INHALATION) at 07:55

## 2019-06-07 RX ADMIN — OXYCODONE HYDROCHLORIDE AND ACETAMINOPHEN SCH MG: 500 TABLET ORAL at 20:35

## 2019-06-07 RX ADMIN — HYDROCORTISONE SODIUM SUCCINATE SCH MG: 100 INJECTION, POWDER, FOR SOLUTION INTRAMUSCULAR; INTRAVENOUS at 05:39

## 2019-06-07 RX ADMIN — MULTIVITAMIN SCH ML: LIQUID ORAL at 09:25

## 2019-06-07 RX ADMIN — SODIUM CHLORIDE SCH MLS/HR: 450 INJECTION, SOLUTION INTRAVENOUS at 15:05

## 2019-06-07 RX ADMIN — DOCUSATE SODIUM LIQUID SCH MG: 100 LIQUID ORAL at 09:25

## 2019-06-07 RX ADMIN — FAMOTIDINE SCH MG: 20 TABLET, FILM COATED ORAL at 09:25

## 2019-06-07 RX ADMIN — LEVALBUTEROL SCH MG: 1.25 SOLUTION, CONCENTRATE RESPIRATORY (INHALATION) at 19:33

## 2019-06-07 RX ADMIN — DEXTROSE MONOHYDRATE SCH MLS/HR: 50 INJECTION, SOLUTION INTRAVENOUS at 05:38

## 2019-06-07 RX ADMIN — DEXTROSE MONOHYDRATE SCH MLS/HR: 50 INJECTION, SOLUTION INTRAVENOUS at 22:47

## 2019-06-07 RX ADMIN — POTASSIUM CHLORIDE SCH MEQ: 1.5 POWDER, FOR SOLUTION ORAL at 15:05

## 2019-06-07 RX ADMIN — IPRATROPIUM BROMIDE SCH MG: 0.5 SOLUTION RESPIRATORY (INHALATION) at 13:45

## 2019-06-07 RX ADMIN — DEXTROSE SCH MLS/HR: 50 INJECTION, SOLUTION INTRAVENOUS at 18:02

## 2019-06-07 RX ADMIN — Medication SCH MG: at 20:35

## 2019-06-07 RX ADMIN — SODIUM CHLORIDE SCH MLS/HR: 450 INJECTION, SOLUTION INTRAVENOUS at 05:38

## 2019-06-07 RX ADMIN — DEXTROSE MONOHYDRATE SCH MLS/HR: 50 INJECTION, SOLUTION INTRAVENOUS at 15:04

## 2019-06-07 RX ADMIN — ATORVASTATIN CALCIUM SCH MG: 20 TABLET, FILM COATED ORAL at 20:34

## 2019-06-07 RX ADMIN — OXYCODONE HYDROCHLORIDE AND ACETAMINOPHEN SCH MG: 500 TABLET ORAL at 09:25

## 2019-06-07 RX ADMIN — LEVALBUTEROL SCH MG: 1.25 SOLUTION, CONCENTRATE RESPIRATORY (INHALATION) at 13:45

## 2019-06-07 RX ADMIN — FAMOTIDINE SCH MG: 20 TABLET, FILM COATED ORAL at 20:34

## 2019-06-07 RX ADMIN — POTASSIUM CHLORIDE SCH MEQ: 1.5 POWDER, FOR SOLUTION ORAL at 22:48

## 2019-06-07 RX ADMIN — LEVALBUTEROL SCH MG: 1.25 SOLUTION, CONCENTRATE RESPIRATORY (INHALATION) at 00:49

## 2019-06-07 RX ADMIN — CASTOR OIL AND BALSAM, PERU SCH GM: 788; 87 OINTMENT TOPICAL at 09:26

## 2019-06-07 RX ADMIN — SODIUM CHLORIDE SCH MLS/HR: 0.9 INJECTION, SOLUTION INTRAVENOUS at 15:13

## 2019-06-07 RX ADMIN — DOCUSATE SODIUM LIQUID SCH MG: 100 LIQUID ORAL at 20:34

## 2019-06-07 RX ADMIN — Medication SCH MG: at 09:00

## 2019-06-07 RX ADMIN — IPRATROPIUM BROMIDE SCH MG: 0.5 SOLUTION RESPIRATORY (INHALATION) at 19:33

## 2019-06-07 RX ADMIN — LEVALBUTEROL SCH MG: 1.25 SOLUTION, CONCENTRATE RESPIRATORY (INHALATION) at 07:54

## 2019-06-07 RX ADMIN — Medication SCH CAP: at 09:25

## 2019-06-07 RX ADMIN — POTASSIUM CHLORIDE SCH MEQ: 1.5 POWDER, FOR SOLUTION ORAL at 18:03

## 2019-06-07 RX ADMIN — HYDROCORTISONE SODIUM SUCCINATE SCH MG: 100 INJECTION, POWDER, FOR SOLUTION INTRAMUSCULAR; INTRAVENOUS at 22:47

## 2019-06-07 RX ADMIN — Medication SCH MG: at 09:25

## 2019-06-07 RX ADMIN — IPRATROPIUM BROMIDE SCH MG: 0.5 SOLUTION RESPIRATORY (INHALATION) at 00:49

## 2019-06-07 RX ADMIN — ENOXAPARIN SODIUM SCH MG: 40 INJECTION SUBCUTANEOUS at 20:36

## 2019-06-08 VITALS — SYSTOLIC BLOOD PRESSURE: 108 MMHG

## 2019-06-08 VITALS — SYSTOLIC BLOOD PRESSURE: 124 MMHG

## 2019-06-08 VITALS — SYSTOLIC BLOOD PRESSURE: 100 MMHG

## 2019-06-08 VITALS — SYSTOLIC BLOOD PRESSURE: 102 MMHG

## 2019-06-08 VITALS — SYSTOLIC BLOOD PRESSURE: 130 MMHG

## 2019-06-08 VITALS — SYSTOLIC BLOOD PRESSURE: 136 MMHG

## 2019-06-08 VITALS — SYSTOLIC BLOOD PRESSURE: 146 MMHG

## 2019-06-08 VITALS — SYSTOLIC BLOOD PRESSURE: 133 MMHG

## 2019-06-08 VITALS — SYSTOLIC BLOOD PRESSURE: 116 MMHG

## 2019-06-08 VITALS — SYSTOLIC BLOOD PRESSURE: 114 MMHG

## 2019-06-08 VITALS — SYSTOLIC BLOOD PRESSURE: 101 MMHG

## 2019-06-08 VITALS — SYSTOLIC BLOOD PRESSURE: 129 MMHG

## 2019-06-08 VITALS — SYSTOLIC BLOOD PRESSURE: 115 MMHG

## 2019-06-08 VITALS — SYSTOLIC BLOOD PRESSURE: 107 MMHG

## 2019-06-08 VITALS — SYSTOLIC BLOOD PRESSURE: 92 MMHG

## 2019-06-08 VITALS — SYSTOLIC BLOOD PRESSURE: 96 MMHG

## 2019-06-08 VITALS — SYSTOLIC BLOOD PRESSURE: 147 MMHG

## 2019-06-08 VITALS — SYSTOLIC BLOOD PRESSURE: 137 MMHG

## 2019-06-08 VITALS — SYSTOLIC BLOOD PRESSURE: 119 MMHG

## 2019-06-08 VITALS — SYSTOLIC BLOOD PRESSURE: 122 MMHG

## 2019-06-08 LAB
ANION GAP SERPL CALCULATED.3IONS-SCNC: 8 MMOL/L (ref 5–15)
BASOPHILS # BLD AUTO: 0 K/UL (ref 0–0.2)
BASOPHILS NFR BLD AUTO: 0.2 % (ref 0–2)
BUN SERPL-MCNC: 24 MG/DL (ref 8–21)
CALCIUM SERPL-MCNC: 7.6 MG/DL (ref 8.4–11)
CHLORIDE SERPL-SCNC: 114 MMOL/L (ref 98–107)
CREAT SERPL-MCNC: 0.66 MG/DL (ref 0.55–1.3)
EOSINOPHIL # BLD AUTO: 0 K/UL (ref 0–0.4)
EOSINOPHIL NFR BLD AUTO: 0.3 % (ref 0–4)
ERYTHROCYTE [DISTWIDTH] IN BLOOD BY AUTOMATED COUNT: 18.5 % (ref 9–15)
GFR SERPL CREATININE-BSD FRML MDRD: (no result) ML/MIN (ref 90–?)
GLUCOSE SERPL-MCNC: 87 MG/DL (ref 70–99)
HCT VFR BLD AUTO: 26 % (ref 36–54)
HGB BLD-MCNC: 8.5 G/DL (ref 14–18)
LYMPHOCYTES # BLD AUTO: 1.5 K/UL (ref 1–5.5)
LYMPHOCYTES NFR BLD AUTO: 16.4 % (ref 20.5–51.5)
MCH RBC QN AUTO: 32 PG (ref 27–31)
MCHC RBC AUTO-ENTMCNC: 33 % (ref 32–36)
MCV RBC AUTO: 97 FL (ref 79–98)
MONOCYTES # BLD MANUAL: 0.4 K/UL (ref 0–1)
MONOCYTES # BLD MANUAL: 4.2 % (ref 1.7–9.3)
NEUTROPHILS # BLD AUTO: 7.4 K/UL (ref 1.8–7.7)
NEUTROPHILS NFR BLD AUTO: 78.9 % (ref 40–70)
PLATELET # BLD AUTO: 117 K/UL (ref 130–430)
POTASSIUM SERPL-SCNC: 4 MMOL/L (ref 3.5–5.1)
RBC # BLD AUTO: 2.69 MIL/UL (ref 4.2–6.2)
SODIUM SERPLBLD-SCNC: 143 MMOL/L (ref 136–145)
WBC # BLD AUTO: 9.4 K/UL (ref 4.8–10.8)

## 2019-06-08 RX ADMIN — DEXTROSE SCH MLS/HR: 50 INJECTION, SOLUTION INTRAVENOUS at 05:20

## 2019-06-08 RX ADMIN — HYDROCORTISONE SODIUM SUCCINATE SCH MG: 100 INJECTION, POWDER, FOR SOLUTION INTRAMUSCULAR; INTRAVENOUS at 05:59

## 2019-06-08 RX ADMIN — DEXTROSE SCH MLS/HR: 50 INJECTION, SOLUTION INTRAVENOUS at 17:13

## 2019-06-08 RX ADMIN — DOCUSATE SODIUM LIQUID SCH MG: 100 LIQUID ORAL at 20:32

## 2019-06-08 RX ADMIN — LEVALBUTEROL SCH MG: 1.25 SOLUTION, CONCENTRATE RESPIRATORY (INHALATION) at 13:43

## 2019-06-08 RX ADMIN — LEVALBUTEROL SCH MG: 1.25 SOLUTION, CONCENTRATE RESPIRATORY (INHALATION) at 07:00

## 2019-06-08 RX ADMIN — MULTIVITAMIN SCH ML: LIQUID ORAL at 09:22

## 2019-06-08 RX ADMIN — FAMOTIDINE SCH MG: 20 TABLET, FILM COATED ORAL at 09:22

## 2019-06-08 RX ADMIN — Medication SCH MG: at 09:00

## 2019-06-08 RX ADMIN — DEXTROSE MONOHYDRATE SCH MLS/HR: 50 INJECTION, SOLUTION INTRAVENOUS at 15:17

## 2019-06-08 RX ADMIN — Medication SCH MG: at 09:23

## 2019-06-08 RX ADMIN — HYDROCORTISONE SODIUM SUCCINATE SCH MG: 100 INJECTION, POWDER, FOR SOLUTION INTRAMUSCULAR; INTRAVENOUS at 21:34

## 2019-06-08 RX ADMIN — CASTOR OIL AND BALSAM, PERU SCH GM: 788; 87 OINTMENT TOPICAL at 09:23

## 2019-06-08 RX ADMIN — IPRATROPIUM BROMIDE SCH MG: 0.5 SOLUTION RESPIRATORY (INHALATION) at 19:55

## 2019-06-08 RX ADMIN — Medication SCH MG: at 20:32

## 2019-06-08 RX ADMIN — FUROSEMIDE SCH MG: 10 INJECTION, SOLUTION INTRAMUSCULAR; INTRAVENOUS at 09:22

## 2019-06-08 RX ADMIN — LEVALBUTEROL SCH MG: 1.25 SOLUTION, CONCENTRATE RESPIRATORY (INHALATION) at 01:37

## 2019-06-08 RX ADMIN — ENOXAPARIN SODIUM SCH MG: 40 INJECTION SUBCUTANEOUS at 20:33

## 2019-06-08 RX ADMIN — OXYCODONE HYDROCHLORIDE AND ACETAMINOPHEN SCH MG: 500 TABLET ORAL at 09:22

## 2019-06-08 RX ADMIN — ATORVASTATIN CALCIUM SCH MG: 20 TABLET, FILM COATED ORAL at 20:32

## 2019-06-08 RX ADMIN — SODIUM PHOSPHATE, DIBASIC, ANHYDROUS, POTASSIUM PHOSPHATE, MONOBASIC, AND SODIUM PHOSPHATE, MONOBASIC, MONOHYDRATE SCH MG: 852; 155; 130 TABLET, COATED ORAL at 09:22

## 2019-06-08 RX ADMIN — LEVALBUTEROL SCH MG: 1.25 SOLUTION, CONCENTRATE RESPIRATORY (INHALATION) at 19:55

## 2019-06-08 RX ADMIN — HYDROCORTISONE SODIUM SUCCINATE SCH MG: 100 INJECTION, POWDER, FOR SOLUTION INTRAMUSCULAR; INTRAVENOUS at 15:17

## 2019-06-08 RX ADMIN — Medication SCH CAP: at 09:22

## 2019-06-08 RX ADMIN — IPRATROPIUM BROMIDE SCH MG: 0.5 SOLUTION RESPIRATORY (INHALATION) at 01:37

## 2019-06-08 RX ADMIN — IPRATROPIUM BROMIDE SCH MG: 0.5 SOLUTION RESPIRATORY (INHALATION) at 13:42

## 2019-06-08 RX ADMIN — DEXTROSE MONOHYDRATE SCH MLS/HR: 50 INJECTION, SOLUTION INTRAVENOUS at 05:59

## 2019-06-08 RX ADMIN — IPRATROPIUM BROMIDE SCH MG: 0.5 SOLUTION RESPIRATORY (INHALATION) at 07:52

## 2019-06-08 RX ADMIN — OXYCODONE HYDROCHLORIDE AND ACETAMINOPHEN SCH MG: 500 TABLET ORAL at 20:31

## 2019-06-08 RX ADMIN — SODIUM CHLORIDE SCH MLS/HR: 450 INJECTION, SOLUTION INTRAVENOUS at 09:48

## 2019-06-08 RX ADMIN — FAMOTIDINE SCH MG: 20 TABLET, FILM COATED ORAL at 20:32

## 2019-06-08 RX ADMIN — DEXTROSE MONOHYDRATE SCH MLS/HR: 50 INJECTION, SOLUTION INTRAVENOUS at 21:35

## 2019-06-08 RX ADMIN — DOCUSATE SODIUM LIQUID SCH MG: 100 LIQUID ORAL at 09:22

## 2019-06-09 VITALS — SYSTOLIC BLOOD PRESSURE: 141 MMHG

## 2019-06-09 VITALS — SYSTOLIC BLOOD PRESSURE: 131 MMHG

## 2019-06-09 VITALS — SYSTOLIC BLOOD PRESSURE: 136 MMHG

## 2019-06-09 VITALS — SYSTOLIC BLOOD PRESSURE: 126 MMHG

## 2019-06-09 VITALS — SYSTOLIC BLOOD PRESSURE: 129 MMHG

## 2019-06-09 VITALS — SYSTOLIC BLOOD PRESSURE: 130 MMHG

## 2019-06-09 VITALS — SYSTOLIC BLOOD PRESSURE: 138 MMHG

## 2019-06-09 VITALS — SYSTOLIC BLOOD PRESSURE: 140 MMHG

## 2019-06-09 VITALS — SYSTOLIC BLOOD PRESSURE: 123 MMHG

## 2019-06-09 VITALS — SYSTOLIC BLOOD PRESSURE: 121 MMHG

## 2019-06-09 VITALS — SYSTOLIC BLOOD PRESSURE: 125 MMHG

## 2019-06-09 VITALS — SYSTOLIC BLOOD PRESSURE: 137 MMHG

## 2019-06-09 VITALS — SYSTOLIC BLOOD PRESSURE: 114 MMHG

## 2019-06-09 VITALS — SYSTOLIC BLOOD PRESSURE: 117 MMHG

## 2019-06-09 VITALS — SYSTOLIC BLOOD PRESSURE: 124 MMHG

## 2019-06-09 VITALS — SYSTOLIC BLOOD PRESSURE: 119 MMHG

## 2019-06-09 VITALS — SYSTOLIC BLOOD PRESSURE: 135 MMHG

## 2019-06-09 VITALS — SYSTOLIC BLOOD PRESSURE: 115 MMHG

## 2019-06-09 LAB
ANION GAP SERPL CALCULATED.3IONS-SCNC: 8 MMOL/L (ref 5–15)
BASOPHILS # BLD AUTO: 0 K/UL (ref 0–0.2)
BASOPHILS NFR BLD AUTO: 0.4 % (ref 0–2)
BUN SERPL-MCNC: 24 MG/DL (ref 8–21)
CALCIUM SERPL-MCNC: 8 MG/DL (ref 8.4–11)
CHLORIDE SERPL-SCNC: 111 MMOL/L (ref 98–107)
CREAT SERPL-MCNC: 0.51 MG/DL (ref 0.55–1.3)
EOSINOPHIL # BLD AUTO: 0.1 K/UL (ref 0–0.4)
EOSINOPHIL NFR BLD AUTO: 1.5 % (ref 0–4)
ERYTHROCYTE [DISTWIDTH] IN BLOOD BY AUTOMATED COUNT: 18.5 % (ref 9–15)
GFR SERPL CREATININE-BSD FRML MDRD: (no result) ML/MIN (ref 90–?)
GLUCOSE SERPL-MCNC: 104 MG/DL (ref 70–99)
HCT VFR BLD AUTO: 27.3 % (ref 36–54)
HGB BLD-MCNC: 9.1 G/DL (ref 14–18)
LYMPHOCYTES # BLD AUTO: 1.7 K/UL (ref 1–5.5)
LYMPHOCYTES NFR BLD AUTO: 23.6 % (ref 20.5–51.5)
MCH RBC QN AUTO: 32 PG (ref 27–31)
MCHC RBC AUTO-ENTMCNC: 33 % (ref 32–36)
MCV RBC AUTO: 96 FL (ref 79–98)
MONOCYTES # BLD MANUAL: 0.3 K/UL (ref 0–1)
MONOCYTES # BLD MANUAL: 4.6 % (ref 1.7–9.3)
NEUTROPHILS # BLD AUTO: 4.9 K/UL (ref 1.8–7.7)
NEUTROPHILS NFR BLD AUTO: 69.9 % (ref 40–70)
PLATELET # BLD AUTO: 145 K/UL (ref 130–430)
POTASSIUM SERPL-SCNC: 3.1 MMOL/L (ref 3.5–5.1)
RBC # BLD AUTO: 2.85 MIL/UL (ref 4.2–6.2)
SODIUM SERPLBLD-SCNC: 141 MMOL/L (ref 136–145)
WBC # BLD AUTO: 7.1 K/UL (ref 4.8–10.8)

## 2019-06-09 RX ADMIN — FUROSEMIDE SCH MG: 10 INJECTION, SOLUTION INTRAMUSCULAR; INTRAVENOUS at 18:15

## 2019-06-09 RX ADMIN — OXYCODONE HYDROCHLORIDE AND ACETAMINOPHEN SCH MG: 500 TABLET ORAL at 09:44

## 2019-06-09 RX ADMIN — IPRATROPIUM BROMIDE SCH MG: 0.5 SOLUTION RESPIRATORY (INHALATION) at 01:06

## 2019-06-09 RX ADMIN — LEVALBUTEROL SCH MG: 1.25 SOLUTION, CONCENTRATE RESPIRATORY (INHALATION) at 19:39

## 2019-06-09 RX ADMIN — IPRATROPIUM BROMIDE SCH MG: 0.5 SOLUTION RESPIRATORY (INHALATION) at 07:24

## 2019-06-09 RX ADMIN — ATORVASTATIN CALCIUM SCH MG: 20 TABLET, FILM COATED ORAL at 21:36

## 2019-06-09 RX ADMIN — Medication SCH MG: at 09:43

## 2019-06-09 RX ADMIN — CASTOR OIL AND BALSAM, PERU SCH GM: 788; 87 OINTMENT TOPICAL at 09:45

## 2019-06-09 RX ADMIN — Medication SCH MG: at 21:37

## 2019-06-09 RX ADMIN — FAMOTIDINE SCH MG: 20 TABLET, FILM COATED ORAL at 09:44

## 2019-06-09 RX ADMIN — DOCUSATE SODIUM LIQUID SCH MG: 100 LIQUID ORAL at 21:35

## 2019-06-09 RX ADMIN — LEVALBUTEROL SCH MG: 1.25 SOLUTION, CONCENTRATE RESPIRATORY (INHALATION) at 13:42

## 2019-06-09 RX ADMIN — DOCUSATE SODIUM LIQUID SCH MG: 100 LIQUID ORAL at 09:44

## 2019-06-09 RX ADMIN — FAMOTIDINE SCH MG: 20 TABLET, FILM COATED ORAL at 21:37

## 2019-06-09 RX ADMIN — DEXTROSE SCH MLS/HR: 50 INJECTION, SOLUTION INTRAVENOUS at 17:00

## 2019-06-09 RX ADMIN — IPRATROPIUM BROMIDE SCH MG: 0.5 SOLUTION RESPIRATORY (INHALATION) at 13:42

## 2019-06-09 RX ADMIN — LEVALBUTEROL SCH MG: 1.25 SOLUTION, CONCENTRATE RESPIRATORY (INHALATION) at 07:24

## 2019-06-09 RX ADMIN — HYDROCORTISONE SODIUM SUCCINATE SCH MG: 100 INJECTION, POWDER, FOR SOLUTION INTRAMUSCULAR; INTRAVENOUS at 15:12

## 2019-06-09 RX ADMIN — HYDROCORTISONE SODIUM SUCCINATE SCH MG: 100 INJECTION, POWDER, FOR SOLUTION INTRAMUSCULAR; INTRAVENOUS at 21:36

## 2019-06-09 RX ADMIN — Medication SCH CAP: at 09:42

## 2019-06-09 RX ADMIN — FUROSEMIDE SCH MG: 10 INJECTION, SOLUTION INTRAMUSCULAR; INTRAVENOUS at 09:44

## 2019-06-09 RX ADMIN — DEXTROSE SCH MLS/HR: 50 INJECTION, SOLUTION INTRAVENOUS at 05:59

## 2019-06-09 RX ADMIN — LEVALBUTEROL SCH MG: 1.25 SOLUTION, CONCENTRATE RESPIRATORY (INHALATION) at 01:06

## 2019-06-09 RX ADMIN — HYDROCORTISONE SODIUM SUCCINATE SCH MG: 100 INJECTION, POWDER, FOR SOLUTION INTRAMUSCULAR; INTRAVENOUS at 05:14

## 2019-06-09 RX ADMIN — DEXTROSE MONOHYDRATE SCH MLS/HR: 50 INJECTION, SOLUTION INTRAVENOUS at 05:14

## 2019-06-09 RX ADMIN — SODIUM CHLORIDE SCH MLS/HR: 450 INJECTION, SOLUTION INTRAVENOUS at 09:45

## 2019-06-09 RX ADMIN — DEXTROSE SCH MLS/HR: 50 INJECTION, SOLUTION INTRAVENOUS at 21:35

## 2019-06-09 RX ADMIN — MULTIVITAMIN SCH ML: LIQUID ORAL at 09:42

## 2019-06-09 RX ADMIN — ENOXAPARIN SODIUM SCH MG: 40 INJECTION SUBCUTANEOUS at 21:39

## 2019-06-09 RX ADMIN — SODIUM PHOSPHATE, DIBASIC, ANHYDROUS, POTASSIUM PHOSPHATE, MONOBASIC, AND SODIUM PHOSPHATE, MONOBASIC, MONOHYDRATE SCH MG: 852; 155; 130 TABLET, COATED ORAL at 09:42

## 2019-06-09 RX ADMIN — OXYCODONE HYDROCHLORIDE AND ACETAMINOPHEN SCH MG: 500 TABLET ORAL at 21:41

## 2019-06-09 RX ADMIN — IPRATROPIUM BROMIDE SCH MG: 0.5 SOLUTION RESPIRATORY (INHALATION) at 19:39

## 2019-06-10 VITALS — SYSTOLIC BLOOD PRESSURE: 120 MMHG

## 2019-06-10 VITALS — SYSTOLIC BLOOD PRESSURE: 150 MMHG

## 2019-06-10 VITALS — SYSTOLIC BLOOD PRESSURE: 117 MMHG

## 2019-06-10 VITALS — SYSTOLIC BLOOD PRESSURE: 121 MMHG

## 2019-06-10 VITALS — SYSTOLIC BLOOD PRESSURE: 123 MMHG

## 2019-06-10 VITALS — SYSTOLIC BLOOD PRESSURE: 130 MMHG

## 2019-06-10 VITALS — SYSTOLIC BLOOD PRESSURE: 145 MMHG

## 2019-06-10 VITALS — SYSTOLIC BLOOD PRESSURE: 129 MMHG

## 2019-06-10 VITALS — SYSTOLIC BLOOD PRESSURE: 126 MMHG

## 2019-06-10 VITALS — SYSTOLIC BLOOD PRESSURE: 105 MMHG

## 2019-06-10 LAB
ANION GAP SERPL CALCULATED.3IONS-SCNC: 6 MMOL/L (ref 5–15)
BASOPHILS # BLD AUTO: 0.1 K/UL (ref 0–0.2)
BASOPHILS NFR BLD AUTO: 0.6 % (ref 0–2)
BUN SERPL-MCNC: 24 MG/DL (ref 8–21)
CALCIUM SERPL-MCNC: 7.6 MG/DL (ref 8.4–11)
CHLORIDE SERPL-SCNC: 106 MMOL/L (ref 98–107)
CREAT SERPL-MCNC: 0.57 MG/DL (ref 0.55–1.3)
EOSINOPHIL # BLD AUTO: 0.1 K/UL (ref 0–0.4)
EOSINOPHIL NFR BLD AUTO: 0.9 % (ref 0–4)
ERYTHROCYTE [DISTWIDTH] IN BLOOD BY AUTOMATED COUNT: 18.3 % (ref 9–15)
GFR SERPL CREATININE-BSD FRML MDRD: (no result) ML/MIN (ref 90–?)
GLUCOSE SERPL-MCNC: 142 MG/DL (ref 70–99)
HCT VFR BLD AUTO: 26.1 % (ref 36–54)
HGB BLD-MCNC: 8.8 G/DL (ref 14–18)
LYMPHOCYTES # BLD AUTO: 1.7 K/UL (ref 1–5.5)
LYMPHOCYTES NFR BLD AUTO: 20.7 % (ref 20.5–51.5)
MCH RBC QN AUTO: 32 PG (ref 27–31)
MCHC RBC AUTO-ENTMCNC: 34 % (ref 32–36)
MCV RBC AUTO: 96 FL (ref 79–98)
MONOCYTES # BLD MANUAL: 0.5 K/UL (ref 0–1)
MONOCYTES # BLD MANUAL: 5.8 % (ref 1.7–9.3)
NEUTROPHILS # BLD AUTO: 5.9 K/UL (ref 1.8–7.7)
NEUTROPHILS NFR BLD AUTO: 72 % (ref 40–70)
PLATELET # BLD AUTO: 157 K/UL (ref 130–430)
POTASSIUM SERPL-SCNC: 3.1 MMOL/L (ref 3.5–5.1)
RBC # BLD AUTO: 2.72 MIL/UL (ref 4.2–6.2)
SODIUM SERPLBLD-SCNC: 138 MMOL/L (ref 136–145)
WBC # BLD AUTO: 8.2 K/UL (ref 4.8–10.8)

## 2019-06-10 RX ADMIN — Medication SCH MG: at 08:47

## 2019-06-10 RX ADMIN — LEVALBUTEROL SCH MG: 1.25 SOLUTION, CONCENTRATE RESPIRATORY (INHALATION) at 14:11

## 2019-06-10 RX ADMIN — FAMOTIDINE SCH MG: 20 TABLET, FILM COATED ORAL at 20:45

## 2019-06-10 RX ADMIN — DEXTROSE SCH MLS/HR: 50 INJECTION, SOLUTION INTRAVENOUS at 05:55

## 2019-06-10 RX ADMIN — HYDROCORTISONE SODIUM SUCCINATE SCH MG: 100 INJECTION, POWDER, FOR SOLUTION INTRAMUSCULAR; INTRAVENOUS at 13:59

## 2019-06-10 RX ADMIN — IPRATROPIUM BROMIDE SCH MG: 0.5 SOLUTION RESPIRATORY (INHALATION) at 14:11

## 2019-06-10 RX ADMIN — ENOXAPARIN SODIUM SCH MG: 40 INJECTION SUBCUTANEOUS at 20:01

## 2019-06-10 RX ADMIN — DEXTROSE SCH MLS/HR: 50 INJECTION, SOLUTION INTRAVENOUS at 08:46

## 2019-06-10 RX ADMIN — IPRATROPIUM BROMIDE SCH MG: 0.5 SOLUTION RESPIRATORY (INHALATION) at 08:13

## 2019-06-10 RX ADMIN — LEVALBUTEROL SCH MG: 1.25 SOLUTION, CONCENTRATE RESPIRATORY (INHALATION) at 01:08

## 2019-06-10 RX ADMIN — POTASSIUM CHLORIDE SCH MEQ: 1.5 POWDER, FOR SOLUTION ORAL at 13:59

## 2019-06-10 RX ADMIN — OXYCODONE HYDROCHLORIDE AND ACETAMINOPHEN SCH MG: 500 TABLET ORAL at 08:46

## 2019-06-10 RX ADMIN — FUROSEMIDE SCH MG: 10 INJECTION, SOLUTION INTRAMUSCULAR; INTRAVENOUS at 05:54

## 2019-06-10 RX ADMIN — DEXTROSE SCH MLS/HR: 50 INJECTION, SOLUTION INTRAVENOUS at 17:07

## 2019-06-10 RX ADMIN — Medication SCH CAP: at 08:46

## 2019-06-10 RX ADMIN — LEVALBUTEROL SCH MG: 1.25 SOLUTION, CONCENTRATE RESPIRATORY (INHALATION) at 08:13

## 2019-06-10 RX ADMIN — ATORVASTATIN CALCIUM SCH MG: 20 TABLET, FILM COATED ORAL at 20:45

## 2019-06-10 RX ADMIN — DEXTROSE SCH MLS/HR: 50 INJECTION, SOLUTION INTRAVENOUS at 20:44

## 2019-06-10 RX ADMIN — LEVALBUTEROL SCH MG: 1.25 SOLUTION, CONCENTRATE RESPIRATORY (INHALATION) at 20:03

## 2019-06-10 RX ADMIN — CASTOR OIL AND BALSAM, PERU SCH APPLIC: 788; 87 OINTMENT TOPICAL at 09:05

## 2019-06-10 RX ADMIN — MULTIVITAMIN SCH ML: LIQUID ORAL at 08:46

## 2019-06-10 RX ADMIN — ACETYLCYSTEINE SCH ML: 200 INHALANT RESPIRATORY (INHALATION) at 20:04

## 2019-06-10 RX ADMIN — IPRATROPIUM BROMIDE SCH MG: 0.5 SOLUTION RESPIRATORY (INHALATION) at 01:09

## 2019-06-10 RX ADMIN — POTASSIUM CHLORIDE SCH MEQ: 1.5 POWDER, FOR SOLUTION ORAL at 09:45

## 2019-06-10 RX ADMIN — OXYCODONE HYDROCHLORIDE AND ACETAMINOPHEN SCH MG: 500 TABLET ORAL at 20:45

## 2019-06-10 RX ADMIN — SODIUM CHLORIDE SCH MLS/HR: 450 INJECTION, SOLUTION INTRAVENOUS at 09:09

## 2019-06-10 RX ADMIN — SODIUM PHOSPHATE, DIBASIC, ANHYDROUS, POTASSIUM PHOSPHATE, MONOBASIC, AND SODIUM PHOSPHATE, MONOBASIC, MONOHYDRATE SCH MG: 852; 155; 130 TABLET, COATED ORAL at 08:46

## 2019-06-10 RX ADMIN — DOCUSATE SODIUM LIQUID SCH MG: 100 LIQUID ORAL at 08:46

## 2019-06-10 RX ADMIN — HYDROCORTISONE SODIUM SUCCINATE SCH MG: 100 INJECTION, POWDER, FOR SOLUTION INTRAMUSCULAR; INTRAVENOUS at 05:49

## 2019-06-10 RX ADMIN — FAMOTIDINE SCH MG: 20 TABLET, FILM COATED ORAL at 08:47

## 2019-06-10 RX ADMIN — Medication SCH MG: at 20:46

## 2019-06-10 RX ADMIN — DOCUSATE SODIUM LIQUID SCH MG: 100 LIQUID ORAL at 20:45

## 2019-06-10 RX ADMIN — HYDROCORTISONE SODIUM SUCCINATE SCH MG: 100 INJECTION, POWDER, FOR SOLUTION INTRAMUSCULAR; INTRAVENOUS at 20:46

## 2019-06-10 RX ADMIN — FUROSEMIDE SCH MG: 10 INJECTION, SOLUTION INTRAMUSCULAR; INTRAVENOUS at 18:24

## 2019-06-10 RX ADMIN — IPRATROPIUM BROMIDE SCH MG: 0.5 SOLUTION RESPIRATORY (INHALATION) at 20:03

## 2019-06-10 RX ADMIN — ACETYLCYSTEINE SCH ML: 200 INHALANT RESPIRATORY (INHALATION) at 14:12

## 2019-06-11 VITALS — SYSTOLIC BLOOD PRESSURE: 130 MMHG

## 2019-06-11 VITALS — SYSTOLIC BLOOD PRESSURE: 109 MMHG

## 2019-06-11 VITALS — SYSTOLIC BLOOD PRESSURE: 128 MMHG

## 2019-06-11 VITALS — SYSTOLIC BLOOD PRESSURE: 112 MMHG

## 2019-06-11 LAB
ANION GAP SERPL CALCULATED.3IONS-SCNC: 4 MMOL/L (ref 5–15)
BASOPHILS # BLD AUTO: 0 K/UL (ref 0–0.2)
BASOPHILS NFR BLD AUTO: 0.1 % (ref 0–2)
BUN SERPL-MCNC: 18 MG/DL (ref 8–21)
CALCIUM SERPL-MCNC: 7.9 MG/DL (ref 8.4–11)
CHLORIDE SERPL-SCNC: 106 MMOL/L (ref 98–107)
CREAT SERPL-MCNC: 0.48 MG/DL (ref 0.55–1.3)
EOSINOPHIL # BLD AUTO: 0.2 K/UL (ref 0–0.4)
EOSINOPHIL NFR BLD AUTO: 1.9 % (ref 0–4)
ERYTHROCYTE [DISTWIDTH] IN BLOOD BY AUTOMATED COUNT: 18.7 % (ref 9–15)
GFR SERPL CREATININE-BSD FRML MDRD: (no result) ML/MIN (ref 90–?)
GLUCOSE SERPL-MCNC: 107 MG/DL (ref 70–99)
HCT VFR BLD AUTO: 26.7 % (ref 36–54)
HGB BLD-MCNC: 8.9 G/DL (ref 14–18)
LYMPHOCYTES # BLD AUTO: 2 K/UL (ref 1–5.5)
LYMPHOCYTES NFR BLD AUTO: 23.7 % (ref 20.5–51.5)
MCH RBC QN AUTO: 32 PG (ref 27–31)
MCHC RBC AUTO-ENTMCNC: 34 % (ref 32–36)
MCV RBC AUTO: 95 FL (ref 79–98)
MONOCYTES # BLD MANUAL: 0.6 K/UL (ref 0–1)
MONOCYTES # BLD MANUAL: 6.9 % (ref 1.7–9.3)
NEUTROPHILS # BLD AUTO: 5.7 K/UL (ref 1.8–7.7)
NEUTROPHILS NFR BLD AUTO: 67.4 % (ref 40–70)
PLATELET # BLD AUTO: 174 K/UL (ref 130–430)
POTASSIUM SERPL-SCNC: 3.5 MMOL/L (ref 3.5–5.1)
RBC # BLD AUTO: 2.81 MIL/UL (ref 4.2–6.2)
SODIUM SERPLBLD-SCNC: 138 MMOL/L (ref 136–145)
WBC # BLD AUTO: 8.4 K/UL (ref 4.8–10.8)

## 2019-06-11 RX ADMIN — ACETYLCYSTEINE SCH ML: 200 INHALANT RESPIRATORY (INHALATION) at 09:28

## 2019-06-11 RX ADMIN — FAMOTIDINE SCH MG: 20 TABLET, FILM COATED ORAL at 09:38

## 2019-06-11 RX ADMIN — IPRATROPIUM BROMIDE SCH MG: 0.5 SOLUTION RESPIRATORY (INHALATION) at 01:01

## 2019-06-11 RX ADMIN — DEXTROSE SCH MLS/HR: 50 INJECTION, SOLUTION INTRAVENOUS at 18:03

## 2019-06-11 RX ADMIN — Medication SCH MG: at 09:39

## 2019-06-11 RX ADMIN — ACETYLCYSTEINE SCH ML: 200 INHALANT RESPIRATORY (INHALATION) at 20:01

## 2019-06-11 RX ADMIN — OXYCODONE HYDROCHLORIDE AND ACETAMINOPHEN SCH MG: 500 TABLET ORAL at 09:39

## 2019-06-11 RX ADMIN — IPRATROPIUM BROMIDE SCH MG: 0.5 SOLUTION RESPIRATORY (INHALATION) at 09:28

## 2019-06-11 RX ADMIN — ACETYLCYSTEINE SCH ML: 200 INHALANT RESPIRATORY (INHALATION) at 14:58

## 2019-06-11 RX ADMIN — LEVALBUTEROL SCH MG: 1.25 SOLUTION, CONCENTRATE RESPIRATORY (INHALATION) at 01:02

## 2019-06-11 RX ADMIN — IPRATROPIUM BROMIDE SCH MG: 0.5 SOLUTION RESPIRATORY (INHALATION) at 14:58

## 2019-06-11 RX ADMIN — Medication SCH CAP: at 09:36

## 2019-06-11 RX ADMIN — LEVALBUTEROL SCH MG: 1.25 SOLUTION, CONCENTRATE RESPIRATORY (INHALATION) at 14:58

## 2019-06-11 RX ADMIN — FUROSEMIDE SCH MG: 10 INJECTION, SOLUTION INTRAMUSCULAR; INTRAVENOUS at 05:26

## 2019-06-11 RX ADMIN — DOCUSATE SODIUM LIQUID SCH MG: 100 LIQUID ORAL at 09:41

## 2019-06-11 RX ADMIN — HYDROCORTISONE SODIUM SUCCINATE SCH MG: 100 INJECTION, POWDER, FOR SOLUTION INTRAMUSCULAR; INTRAVENOUS at 05:23

## 2019-06-11 RX ADMIN — MULTIVITAMIN SCH ML: LIQUID ORAL at 09:44

## 2019-06-11 RX ADMIN — LEVALBUTEROL SCH MG: 1.25 SOLUTION, CONCENTRATE RESPIRATORY (INHALATION) at 09:28

## 2019-06-11 RX ADMIN — DEXTROSE SCH MLS/HR: 50 INJECTION, SOLUTION INTRAVENOUS at 09:40

## 2019-06-11 RX ADMIN — DEXTROSE SCH MLS/HR: 50 INJECTION, SOLUTION INTRAVENOUS at 05:24

## 2019-06-11 RX ADMIN — IPRATROPIUM BROMIDE SCH MG: 0.5 SOLUTION RESPIRATORY (INHALATION) at 19:59

## 2019-06-11 RX ADMIN — CASTOR OIL AND BALSAM, PERU SCH APPLIC: 788; 87 OINTMENT TOPICAL at 09:41

## 2019-06-11 RX ADMIN — SODIUM CHLORIDE SCH MLS/HR: 450 INJECTION, SOLUTION INTRAVENOUS at 09:56

## 2019-06-11 RX ADMIN — LEVALBUTEROL SCH MG: 1.25 SOLUTION, CONCENTRATE RESPIRATORY (INHALATION) at 19:59

## 2019-06-11 RX ADMIN — Medication SCH MG: at 09:41

## 2019-06-11 RX ADMIN — SODIUM PHOSPHATE, DIBASIC, ANHYDROUS, POTASSIUM PHOSPHATE, MONOBASIC, AND SODIUM PHOSPHATE, MONOBASIC, MONOHYDRATE SCH MG: 852; 155; 130 TABLET, COATED ORAL at 09:43

## 2019-09-20 ENCOUNTER — HOSPITAL ENCOUNTER (EMERGENCY)
Dept: HOSPITAL 4 - SED | Age: 79
Discharge: NURSING, REHAB OR LONG-TERM CARE | End: 2019-09-20
Payer: COMMERCIAL

## 2019-09-20 VITALS — SYSTOLIC BLOOD PRESSURE: 94 MMHG

## 2019-09-20 VITALS — WEIGHT: 175 LBS | HEIGHT: 70 IN | BODY MASS INDEX: 25.05 KG/M2

## 2019-09-20 DIAGNOSIS — J44.9: ICD-10-CM

## 2019-09-20 DIAGNOSIS — Z88.8: ICD-10-CM

## 2019-09-20 DIAGNOSIS — Z86.73: ICD-10-CM

## 2019-09-20 DIAGNOSIS — Z86.79: ICD-10-CM

## 2019-09-20 DIAGNOSIS — Z79.899: ICD-10-CM

## 2019-09-20 DIAGNOSIS — E11.9: ICD-10-CM

## 2019-09-20 DIAGNOSIS — Z43.1: Primary | ICD-10-CM

## 2019-09-20 PROCEDURE — 43762 RPLC GTUBE NO REVJ TRC: CPT

## 2019-09-20 PROCEDURE — 99284 EMERGENCY DEPT VISIT MOD MDM: CPT

## 2019-09-20 PROCEDURE — 74240 X-RAY XM UPR GI TRC 1CNTRST: CPT

## 2019-10-15 ENCOUNTER — HOSPITAL ENCOUNTER (INPATIENT)
Dept: HOSPITAL 4 - SED | Age: 79
LOS: 7 days | Discharge: NURSING, REHAB OR LONG-TERM CARE | DRG: 871 | End: 2019-10-22
Attending: FAMILY MEDICINE | Admitting: FAMILY MEDICINE
Payer: COMMERCIAL

## 2019-10-15 VITALS — SYSTOLIC BLOOD PRESSURE: 108 MMHG

## 2019-10-15 VITALS — SYSTOLIC BLOOD PRESSURE: 120 MMHG

## 2019-10-15 VITALS — SYSTOLIC BLOOD PRESSURE: 97 MMHG

## 2019-10-15 VITALS — SYSTOLIC BLOOD PRESSURE: 110 MMHG

## 2019-10-15 VITALS — BODY MASS INDEX: 23.05 KG/M2 | WEIGHT: 161 LBS | HEIGHT: 70 IN

## 2019-10-15 VITALS — SYSTOLIC BLOOD PRESSURE: 98 MMHG

## 2019-10-15 VITALS — SYSTOLIC BLOOD PRESSURE: 122 MMHG

## 2019-10-15 VITALS — SYSTOLIC BLOOD PRESSURE: 114 MMHG

## 2019-10-15 VITALS — SYSTOLIC BLOOD PRESSURE: 100 MMHG

## 2019-10-15 VITALS — SYSTOLIC BLOOD PRESSURE: 117 MMHG

## 2019-10-15 VITALS — SYSTOLIC BLOOD PRESSURE: 105 MMHG

## 2019-10-15 VITALS — SYSTOLIC BLOOD PRESSURE: 119 MMHG

## 2019-10-15 VITALS — SYSTOLIC BLOOD PRESSURE: 112 MMHG

## 2019-10-15 VITALS — SYSTOLIC BLOOD PRESSURE: 102 MMHG

## 2019-10-15 DIAGNOSIS — R65.21: ICD-10-CM

## 2019-10-15 DIAGNOSIS — Z86.73: ICD-10-CM

## 2019-10-15 DIAGNOSIS — N17.0: ICD-10-CM

## 2019-10-15 DIAGNOSIS — R79.89: ICD-10-CM

## 2019-10-15 DIAGNOSIS — I10: ICD-10-CM

## 2019-10-15 DIAGNOSIS — J96.01: ICD-10-CM

## 2019-10-15 DIAGNOSIS — Z22.322: ICD-10-CM

## 2019-10-15 DIAGNOSIS — N13.9: ICD-10-CM

## 2019-10-15 DIAGNOSIS — Z87.891: ICD-10-CM

## 2019-10-15 DIAGNOSIS — Z79.899: ICD-10-CM

## 2019-10-15 DIAGNOSIS — R33.8: ICD-10-CM

## 2019-10-15 DIAGNOSIS — E86.0: ICD-10-CM

## 2019-10-15 DIAGNOSIS — A41.9: Primary | ICD-10-CM

## 2019-10-15 DIAGNOSIS — N40.1: ICD-10-CM

## 2019-10-15 DIAGNOSIS — Z87.440: ICD-10-CM

## 2019-10-15 DIAGNOSIS — Z66: ICD-10-CM

## 2019-10-15 DIAGNOSIS — N13.6: ICD-10-CM

## 2019-10-15 DIAGNOSIS — Z88.8: ICD-10-CM

## 2019-10-15 DIAGNOSIS — R31.0: ICD-10-CM

## 2019-10-15 DIAGNOSIS — E87.0: ICD-10-CM

## 2019-10-15 DIAGNOSIS — R00.0: ICD-10-CM

## 2019-10-15 DIAGNOSIS — R13.10: ICD-10-CM

## 2019-10-15 DIAGNOSIS — E43: ICD-10-CM

## 2019-10-15 DIAGNOSIS — F03.90: ICD-10-CM

## 2019-10-15 DIAGNOSIS — J69.0: ICD-10-CM

## 2019-10-15 LAB
ALBUMIN SERPL BCP-MCNC: 2.6 G/DL (ref 3.4–4.8)
ALT SERPL W P-5'-P-CCNC: 114 U/L (ref 12–78)
ANION GAP SERPL CALCULATED.3IONS-SCNC: 11 MMOL/L (ref 5–15)
APPEARANCE UR: (no result)
AST SERPL W P-5'-P-CCNC: 45 U/L (ref 10–37)
BACTERIA URNS QL MICRO: (no result) /HPF
BASOPHILS # BLD AUTO: 0 K/UL (ref 0–0.2)
BASOPHILS NFR BLD AUTO: 0.2 % (ref 0–2)
BILIRUB SERPL-MCNC: 0.8 MG/DL (ref 0–1)
BILIRUB UR QL STRIP: NEGATIVE
BUN SERPL-MCNC: 80 MG/DL (ref 8–21)
CALCIUM SERPL-MCNC: 8.7 MG/DL (ref 8.4–11)
CHLORIDE SERPL-SCNC: 107 MMOL/L (ref 98–107)
COLOR UR: (no result)
CREAT SERPL-MCNC: 1.79 MG/DL (ref 0.55–1.3)
EOSINOPHIL # BLD AUTO: 0 K/UL (ref 0–0.4)
EOSINOPHIL NFR BLD AUTO: 0 % (ref 0–4)
ERYTHROCYTE [DISTWIDTH] IN BLOOD BY AUTOMATED COUNT: 15.5 % (ref 9–15)
GFR SERPL CREATININE-BSD FRML MDRD: (no result) ML/MIN (ref 90–?)
GLUCOSE SERPL-MCNC: 135 MG/DL (ref 70–99)
GLUCOSE UR STRIP-MCNC: NEGATIVE MG/DL
HCT VFR BLD AUTO: 50.2 % (ref 36–54)
HGB BLD-MCNC: 16.9 G/DL (ref 14–18)
HGB UR QL STRIP: (no result)
INR PPP: 1 (ref 0.8–1.2)
KETONES UR STRIP-MCNC: (no result) MG/DL
LEUKOCYTE ESTERASE UR QL STRIP: (no result)
LYMPHOCYTES # BLD AUTO: 1.1 K/UL (ref 1–5.5)
LYMPHOCYTES NFR BLD AUTO: 7 % (ref 20.5–51.5)
MCH RBC QN AUTO: 35 PG (ref 27–31)
MCHC RBC AUTO-ENTMCNC: 34 % (ref 32–36)
MCV RBC AUTO: 103 FL (ref 79–98)
MONOCYTES # BLD MANUAL: 0.8 K/UL (ref 0–1)
MONOCYTES # BLD MANUAL: 5.5 % (ref 1.7–9.3)
NEUTROPHILS # BLD AUTO: 13.4 K/UL (ref 1.8–7.7)
NEUTROPHILS NFR BLD AUTO: 87.3 % (ref 40–70)
NITRITE UR QL STRIP: POSITIVE
PH UR STRIP: 7.5 [PH] (ref 5–8)
PLATELET # BLD AUTO: 197 K/UL (ref 130–430)
POTASSIUM SERPL-SCNC: 4.8 MMOL/L (ref 3.5–5.1)
PROT UR QL STRIP: (no result)
PROTHROMBIN TIME: 9.7 SECS (ref 9.5–12.5)
RBC # BLD AUTO: 4.86 MIL/UL (ref 4.2–6.2)
RBC #/AREA URNS HPF: >100 /HPF (ref 0–3)
SODIUM SERPLBLD-SCNC: 147 MMOL/L (ref 136–145)
SP GR UR STRIP: 1.01 (ref 1–1.03)
UROBILINOGEN UR STRIP-MCNC: 1 MG/DL (ref 0.2–1)
WBC # BLD AUTO: 15.4 K/UL (ref 4.8–10.8)
WBC #/AREA URNS HPF: (no result) /HPF (ref 0–3)

## 2019-10-15 PROCEDURE — G0378 HOSPITAL OBSERVATION PER HR: HCPCS

## 2019-10-15 RX ADMIN — LEVALBUTEROL SCH MG: 1.25 SOLUTION, CONCENTRATE RESPIRATORY (INHALATION) at 20:33

## 2019-10-15 RX ADMIN — IPRATROPIUM BROMIDE SCH MG: 0.5 SOLUTION RESPIRATORY (INHALATION) at 13:00

## 2019-10-15 RX ADMIN — TAZOBACTAM SODIUM AND PIPERACILLIN SODIUM SCH MLS/HR: 375; 3 INJECTION, SOLUTION INTRAVENOUS at 12:19

## 2019-10-15 RX ADMIN — TAZOBACTAM SODIUM AND PIPERACILLIN SODIUM SCH MLS/HR: 375; 3 INJECTION, SOLUTION INTRAVENOUS at 17:33

## 2019-10-15 RX ADMIN — ENOXAPARIN SODIUM SCH MG: 40 INJECTION SUBCUTANEOUS at 21:08

## 2019-10-15 RX ADMIN — Medication SCH MG: at 21:05

## 2019-10-15 RX ADMIN — SODIUM CHLORIDE SCH MLS/HR: 450 INJECTION, SOLUTION INTRAVENOUS at 12:19

## 2019-10-15 RX ADMIN — METHYLPREDNISOLONE SODIUM SUCCINATE SCH MG: 125 INJECTION, POWDER, FOR SOLUTION INTRAMUSCULAR; INTRAVENOUS at 21:05

## 2019-10-15 RX ADMIN — LEVALBUTEROL SCH MG: 1.25 SOLUTION, CONCENTRATE RESPIRATORY (INHALATION) at 13:00

## 2019-10-15 RX ADMIN — TAZOBACTAM SODIUM AND PIPERACILLIN SODIUM SCH MLS/HR: 375; 3 INJECTION, SOLUTION INTRAVENOUS at 23:47

## 2019-10-15 RX ADMIN — IPRATROPIUM BROMIDE SCH MG: 0.5 SOLUTION RESPIRATORY (INHALATION) at 20:34

## 2019-10-15 RX ADMIN — ATORVASTATIN CALCIUM SCH MG: 20 TABLET, FILM COATED ORAL at 21:05

## 2019-10-16 VITALS — SYSTOLIC BLOOD PRESSURE: 102 MMHG

## 2019-10-16 VITALS — SYSTOLIC BLOOD PRESSURE: 111 MMHG

## 2019-10-16 VITALS — SYSTOLIC BLOOD PRESSURE: 128 MMHG

## 2019-10-16 VITALS — SYSTOLIC BLOOD PRESSURE: 93 MMHG

## 2019-10-16 VITALS — SYSTOLIC BLOOD PRESSURE: 92 MMHG

## 2019-10-16 VITALS — SYSTOLIC BLOOD PRESSURE: 107 MMHG

## 2019-10-16 VITALS — SYSTOLIC BLOOD PRESSURE: 121 MMHG

## 2019-10-16 VITALS — SYSTOLIC BLOOD PRESSURE: 95 MMHG

## 2019-10-16 VITALS — SYSTOLIC BLOOD PRESSURE: 104 MMHG

## 2019-10-16 VITALS — SYSTOLIC BLOOD PRESSURE: 113 MMHG

## 2019-10-16 VITALS — SYSTOLIC BLOOD PRESSURE: 98 MMHG

## 2019-10-16 VITALS — SYSTOLIC BLOOD PRESSURE: 131 MMHG

## 2019-10-16 VITALS — SYSTOLIC BLOOD PRESSURE: 119 MMHG

## 2019-10-16 VITALS — SYSTOLIC BLOOD PRESSURE: 109 MMHG

## 2019-10-16 VITALS — SYSTOLIC BLOOD PRESSURE: 97 MMHG

## 2019-10-16 VITALS — SYSTOLIC BLOOD PRESSURE: 90 MMHG

## 2019-10-16 VITALS — SYSTOLIC BLOOD PRESSURE: 105 MMHG

## 2019-10-16 VITALS — SYSTOLIC BLOOD PRESSURE: 126 MMHG

## 2019-10-16 LAB
ALBUMIN SERPL BCP-MCNC: 2 G/DL (ref 3.4–4.8)
ALT SERPL W P-5'-P-CCNC: 67 U/L (ref 12–78)
ANION GAP SERPL CALCULATED.3IONS-SCNC: 13 MMOL/L (ref 5–15)
AST SERPL W P-5'-P-CCNC: 29 U/L (ref 10–37)
BASOPHILS # BLD AUTO: 0 K/UL (ref 0–0.2)
BASOPHILS NFR BLD AUTO: 0.1 % (ref 0–2)
BILIRUB SERPL-MCNC: 0.7 MG/DL (ref 0–1)
BUN SERPL-MCNC: 88 MG/DL (ref 8–21)
CALCIUM SERPL-MCNC: 7.6 MG/DL (ref 8.4–11)
CHLORIDE SERPL-SCNC: 111 MMOL/L (ref 98–107)
CREAT SERPL-MCNC: 2.1 MG/DL (ref 0.55–1.3)
EOSINOPHIL # BLD AUTO: 0.2 K/UL (ref 0–0.4)
EOSINOPHIL NFR BLD AUTO: 1.3 % (ref 0–4)
ERYTHROCYTE [DISTWIDTH] IN BLOOD BY AUTOMATED COUNT: 15.6 % (ref 9–15)
GFR SERPL CREATININE-BSD FRML MDRD: (no result) ML/MIN (ref 90–?)
GLUCOSE SERPL-MCNC: 184 MG/DL (ref 70–99)
HCT VFR BLD AUTO: 41.7 % (ref 36–54)
HGB BLD-MCNC: 13.9 G/DL (ref 14–18)
LYMPHOCYTES # BLD AUTO: 0.5 K/UL (ref 1–5.5)
LYMPHOCYTES NFR BLD AUTO: 3.3 % (ref 20.5–51.5)
MCH RBC QN AUTO: 35 PG (ref 27–31)
MCHC RBC AUTO-ENTMCNC: 33 % (ref 32–36)
MCV RBC AUTO: 105 FL (ref 79–98)
MONOCYTES # BLD MANUAL: 0.3 K/UL (ref 0–1)
MONOCYTES # BLD MANUAL: 2.1 % (ref 1.7–9.3)
NEUTROPHILS # BLD AUTO: 14.4 K/UL (ref 1.8–7.7)
NEUTROPHILS NFR BLD AUTO: 93.2 % (ref 40–70)
PLATELET # BLD AUTO: 166 K/UL (ref 130–430)
POTASSIUM SERPL-SCNC: 4.7 MMOL/L (ref 3.5–5.1)
RBC # BLD AUTO: 3.96 MIL/UL (ref 4.2–6.2)
SODIUM SERPLBLD-SCNC: 146 MMOL/L (ref 136–145)
WBC # BLD AUTO: 15.4 K/UL (ref 4.8–10.8)

## 2019-10-16 RX ADMIN — METHYLPREDNISOLONE SODIUM SUCCINATE SCH MG: 125 INJECTION, POWDER, FOR SOLUTION INTRAMUSCULAR; INTRAVENOUS at 05:45

## 2019-10-16 RX ADMIN — SODIUM CHLORIDE SCH MLS/HR: 450 INJECTION, SOLUTION INTRAVENOUS at 17:45

## 2019-10-16 RX ADMIN — ENOXAPARIN SODIUM SCH MG: 40 INJECTION SUBCUTANEOUS at 20:20

## 2019-10-16 RX ADMIN — TAZOBACTAM SODIUM AND PIPERACILLIN SODIUM SCH MLS/HR: 375; 3 INJECTION, SOLUTION INTRAVENOUS at 18:31

## 2019-10-16 RX ADMIN — ATORVASTATIN CALCIUM SCH MG: 20 TABLET, FILM COATED ORAL at 20:19

## 2019-10-16 RX ADMIN — TAZOBACTAM SODIUM AND PIPERACILLIN SODIUM SCH MLS/HR: 375; 3 INJECTION, SOLUTION INTRAVENOUS at 12:47

## 2019-10-16 RX ADMIN — DOCUSATE SODIUM LIQUID SCH MG: 100 LIQUID ORAL at 09:32

## 2019-10-16 RX ADMIN — LEVALBUTEROL SCH MG: 1.25 SOLUTION, CONCENTRATE RESPIRATORY (INHALATION) at 14:04

## 2019-10-16 RX ADMIN — LEVALBUTEROL SCH MG: 1.25 SOLUTION, CONCENTRATE RESPIRATORY (INHALATION) at 07:00

## 2019-10-16 RX ADMIN — TAZOBACTAM SODIUM AND PIPERACILLIN SODIUM SCH MLS/HR: 375; 3 INJECTION, SOLUTION INTRAVENOUS at 23:48

## 2019-10-16 RX ADMIN — TAZOBACTAM SODIUM AND PIPERACILLIN SODIUM SCH MLS/HR: 375; 3 INJECTION, SOLUTION INTRAVENOUS at 05:44

## 2019-10-16 RX ADMIN — SODIUM CHLORIDE SCH MLS/HR: 450 INJECTION, SOLUTION INTRAVENOUS at 09:32

## 2019-10-16 RX ADMIN — IPRATROPIUM BROMIDE SCH MG: 0.5 SOLUTION RESPIRATORY (INHALATION) at 19:45

## 2019-10-16 RX ADMIN — IPRATROPIUM BROMIDE SCH MG: 0.5 SOLUTION RESPIRATORY (INHALATION) at 14:07

## 2019-10-16 RX ADMIN — METHYLPREDNISOLONE SODIUM SUCCINATE SCH MG: 125 INJECTION, POWDER, FOR SOLUTION INTRAMUSCULAR; INTRAVENOUS at 18:31

## 2019-10-16 RX ADMIN — LEVALBUTEROL SCH MG: 1.25 SOLUTION, CONCENTRATE RESPIRATORY (INHALATION) at 01:09

## 2019-10-16 RX ADMIN — SODIUM CHLORIDE SCH MLS/HR: 450 INJECTION, SOLUTION INTRAVENOUS at 20:30

## 2019-10-16 RX ADMIN — SODIUM CHLORIDE SCH MLS/HR: 450 INJECTION, SOLUTION INTRAVENOUS at 01:50

## 2019-10-16 RX ADMIN — FAMOTIDINE SCH MG: 20 TABLET, FILM COATED ORAL at 09:32

## 2019-10-16 RX ADMIN — Medication SCH MG: at 09:32

## 2019-10-16 RX ADMIN — IPRATROPIUM BROMIDE SCH MG: 0.5 SOLUTION RESPIRATORY (INHALATION) at 01:09

## 2019-10-16 RX ADMIN — LEVALBUTEROL SCH MG: 1.25 SOLUTION, CONCENTRATE RESPIRATORY (INHALATION) at 19:45

## 2019-10-16 RX ADMIN — IPRATROPIUM BROMIDE SCH MG: 0.5 SOLUTION RESPIRATORY (INHALATION) at 14:05

## 2019-10-17 VITALS — SYSTOLIC BLOOD PRESSURE: 100 MMHG

## 2019-10-17 VITALS — SYSTOLIC BLOOD PRESSURE: 127 MMHG

## 2019-10-17 VITALS — SYSTOLIC BLOOD PRESSURE: 93 MMHG

## 2019-10-17 VITALS — SYSTOLIC BLOOD PRESSURE: 75 MMHG

## 2019-10-17 VITALS — SYSTOLIC BLOOD PRESSURE: 90 MMHG

## 2019-10-17 VITALS — SYSTOLIC BLOOD PRESSURE: 86 MMHG

## 2019-10-17 VITALS — SYSTOLIC BLOOD PRESSURE: 104 MMHG

## 2019-10-17 VITALS — SYSTOLIC BLOOD PRESSURE: 110 MMHG

## 2019-10-17 VITALS — SYSTOLIC BLOOD PRESSURE: 116 MMHG

## 2019-10-17 VITALS — SYSTOLIC BLOOD PRESSURE: 114 MMHG

## 2019-10-17 VITALS — SYSTOLIC BLOOD PRESSURE: 87 MMHG

## 2019-10-17 VITALS — SYSTOLIC BLOOD PRESSURE: 103 MMHG

## 2019-10-17 VITALS — SYSTOLIC BLOOD PRESSURE: 96 MMHG

## 2019-10-17 VITALS — SYSTOLIC BLOOD PRESSURE: 108 MMHG

## 2019-10-17 VITALS — SYSTOLIC BLOOD PRESSURE: 105 MMHG

## 2019-10-17 VITALS — SYSTOLIC BLOOD PRESSURE: 111 MMHG

## 2019-10-17 VITALS — SYSTOLIC BLOOD PRESSURE: 109 MMHG

## 2019-10-17 VITALS — SYSTOLIC BLOOD PRESSURE: 99 MMHG

## 2019-10-17 VITALS — SYSTOLIC BLOOD PRESSURE: 91 MMHG

## 2019-10-17 VITALS — SYSTOLIC BLOOD PRESSURE: 101 MMHG

## 2019-10-17 LAB
ANION GAP SERPL CALCULATED.3IONS-SCNC: 12 MMOL/L (ref 5–15)
BASOPHILS # BLD AUTO: 0 K/UL (ref 0–0.2)
BASOPHILS NFR BLD AUTO: 0 % (ref 0–2)
BUN SERPL-MCNC: 98 MG/DL (ref 8–21)
CALCIUM SERPL-MCNC: 8.1 MG/DL (ref 8.4–11)
CHLORIDE SERPL-SCNC: 108 MMOL/L (ref 98–107)
CREAT SERPL-MCNC: 2.96 MG/DL (ref 0.55–1.3)
EOSINOPHIL # BLD AUTO: 0 K/UL (ref 0–0.4)
EOSINOPHIL NFR BLD AUTO: 0 % (ref 0–4)
ERYTHROCYTE [DISTWIDTH] IN BLOOD BY AUTOMATED COUNT: 15.6 % (ref 9–15)
GFR SERPL CREATININE-BSD FRML MDRD: (no result) ML/MIN (ref 90–?)
GLUCOSE SERPL-MCNC: 158 MG/DL (ref 70–99)
HCT VFR BLD AUTO: 37.7 % (ref 36–54)
HGB BLD-MCNC: 12.7 G/DL (ref 14–18)
LYMPHOCYTES # BLD AUTO: 0.6 K/UL (ref 1–5.5)
LYMPHOCYTES NFR BLD AUTO: 3.7 % (ref 20.5–51.5)
MCH RBC QN AUTO: 35 PG (ref 27–31)
MCHC RBC AUTO-ENTMCNC: 34 % (ref 32–36)
MCV RBC AUTO: 103 FL (ref 79–98)
MONOCYTES # BLD MANUAL: 0.3 K/UL (ref 0–1)
MONOCYTES # BLD MANUAL: 1.9 % (ref 1.7–9.3)
NEUTROPHILS # BLD AUTO: 14.9 K/UL (ref 1.8–7.7)
NEUTROPHILS NFR BLD AUTO: 94.4 % (ref 40–70)
PLATELET # BLD AUTO: 213 K/UL (ref 130–430)
POTASSIUM SERPL-SCNC: 4.3 MMOL/L (ref 3.5–5.1)
RBC # BLD AUTO: 3.66 MIL/UL (ref 4.2–6.2)
SODIUM SERPLBLD-SCNC: 143 MMOL/L (ref 136–145)
WBC # BLD AUTO: 15.8 K/UL (ref 4.8–10.8)

## 2019-10-17 RX ADMIN — NOREPINEPHRINE BITARTRATE PRN MLS/HR: 1 INJECTION, SOLUTION, CONCENTRATE INTRAVENOUS at 23:08

## 2019-10-17 RX ADMIN — LEVALBUTEROL SCH MG: 1.25 SOLUTION, CONCENTRATE RESPIRATORY (INHALATION) at 07:32

## 2019-10-17 RX ADMIN — SODIUM CHLORIDE SCH MLS/HR: 450 INJECTION, SOLUTION INTRAVENOUS at 13:45

## 2019-10-17 RX ADMIN — IPRATROPIUM BROMIDE SCH MG: 0.5 SOLUTION RESPIRATORY (INHALATION) at 01:05

## 2019-10-17 RX ADMIN — LEVALBUTEROL SCH MG: 1.25 SOLUTION, CONCENTRATE RESPIRATORY (INHALATION) at 01:05

## 2019-10-17 RX ADMIN — DOCUSATE SODIUM LIQUID SCH MG: 100 LIQUID ORAL at 08:52

## 2019-10-17 RX ADMIN — NOREPINEPHRINE BITARTRATE PRN MLS/HR: 1 INJECTION, SOLUTION, CONCENTRATE INTRAVENOUS at 20:07

## 2019-10-17 RX ADMIN — NOREPINEPHRINE BITARTRATE PRN MLS/HR: 1 INJECTION, SOLUTION, CONCENTRATE INTRAVENOUS at 02:34

## 2019-10-17 RX ADMIN — METHYLPREDNISOLONE SODIUM SUCCINATE SCH MG: 125 INJECTION, POWDER, FOR SOLUTION INTRAMUSCULAR; INTRAVENOUS at 06:14

## 2019-10-17 RX ADMIN — MUPIROCIN SCH GM: 20 OINTMENT TOPICAL at 20:05

## 2019-10-17 RX ADMIN — NOREPINEPHRINE BITARTRATE PRN MLS/HR: 1 INJECTION, SOLUTION, CONCENTRATE INTRAVENOUS at 08:59

## 2019-10-17 RX ADMIN — LEVALBUTEROL SCH MG: 1.25 SOLUTION, CONCENTRATE RESPIRATORY (INHALATION) at 20:37

## 2019-10-17 RX ADMIN — TAZOBACTAM SODIUM AND PIPERACILLIN SODIUM SCH MLS/HR: 375; 3 INJECTION, SOLUTION INTRAVENOUS at 23:10

## 2019-10-17 RX ADMIN — IPRATROPIUM BROMIDE SCH MG: 0.5 SOLUTION RESPIRATORY (INHALATION) at 07:32

## 2019-10-17 RX ADMIN — MUPIROCIN SCH GM: 20 OINTMENT TOPICAL at 08:53

## 2019-10-17 RX ADMIN — TAZOBACTAM SODIUM AND PIPERACILLIN SODIUM SCH MLS/HR: 375; 3 INJECTION, SOLUTION INTRAVENOUS at 11:47

## 2019-10-17 RX ADMIN — ATORVASTATIN CALCIUM SCH MG: 20 TABLET, FILM COATED ORAL at 20:06

## 2019-10-17 RX ADMIN — IPRATROPIUM BROMIDE SCH MG: 0.5 SOLUTION RESPIRATORY (INHALATION) at 20:37

## 2019-10-17 RX ADMIN — Medication SCH MG: at 08:53

## 2019-10-17 RX ADMIN — CASTOR OIL AND BALSAM, PERU SCH GM: 788; 87 OINTMENT TOPICAL at 08:54

## 2019-10-17 RX ADMIN — TAZOBACTAM SODIUM AND PIPERACILLIN SODIUM SCH MLS/HR: 375; 3 INJECTION, SOLUTION INTRAVENOUS at 18:43

## 2019-10-17 RX ADMIN — ENOXAPARIN SODIUM SCH MG: 40 INJECTION SUBCUTANEOUS at 20:08

## 2019-10-17 RX ADMIN — FAMOTIDINE SCH MG: 20 TABLET, FILM COATED ORAL at 08:52

## 2019-10-17 RX ADMIN — METHYLPREDNISOLONE SODIUM SUCCINATE SCH MG: 125 INJECTION, POWDER, FOR SOLUTION INTRAMUSCULAR; INTRAVENOUS at 18:43

## 2019-10-17 RX ADMIN — SODIUM CHLORIDE SCH MLS/HR: 450 INJECTION, SOLUTION INTRAVENOUS at 20:38

## 2019-10-17 RX ADMIN — TAZOBACTAM SODIUM AND PIPERACILLIN SODIUM SCH MLS/HR: 375; 3 INJECTION, SOLUTION INTRAVENOUS at 05:07

## 2019-10-18 VITALS — SYSTOLIC BLOOD PRESSURE: 133 MMHG

## 2019-10-18 VITALS — SYSTOLIC BLOOD PRESSURE: 104 MMHG

## 2019-10-18 VITALS — SYSTOLIC BLOOD PRESSURE: 155 MMHG

## 2019-10-18 VITALS — SYSTOLIC BLOOD PRESSURE: 90 MMHG

## 2019-10-18 VITALS — SYSTOLIC BLOOD PRESSURE: 106 MMHG

## 2019-10-18 VITALS — SYSTOLIC BLOOD PRESSURE: 95 MMHG

## 2019-10-18 VITALS — SYSTOLIC BLOOD PRESSURE: 114 MMHG

## 2019-10-18 VITALS — SYSTOLIC BLOOD PRESSURE: 97 MMHG

## 2019-10-18 VITALS — SYSTOLIC BLOOD PRESSURE: 125 MMHG

## 2019-10-18 VITALS — SYSTOLIC BLOOD PRESSURE: 120 MMHG

## 2019-10-18 VITALS — SYSTOLIC BLOOD PRESSURE: 122 MMHG

## 2019-10-18 VITALS — SYSTOLIC BLOOD PRESSURE: 103 MMHG

## 2019-10-18 VITALS — SYSTOLIC BLOOD PRESSURE: 112 MMHG

## 2019-10-18 VITALS — SYSTOLIC BLOOD PRESSURE: 109 MMHG

## 2019-10-18 VITALS — SYSTOLIC BLOOD PRESSURE: 102 MMHG

## 2019-10-18 VITALS — SYSTOLIC BLOOD PRESSURE: 140 MMHG

## 2019-10-18 VITALS — SYSTOLIC BLOOD PRESSURE: 134 MMHG

## 2019-10-18 VITALS — SYSTOLIC BLOOD PRESSURE: 107 MMHG

## 2019-10-18 VITALS — SYSTOLIC BLOOD PRESSURE: 111 MMHG

## 2019-10-18 VITALS — SYSTOLIC BLOOD PRESSURE: 110 MMHG

## 2019-10-18 VITALS — SYSTOLIC BLOOD PRESSURE: 179 MMHG

## 2019-10-18 VITALS — SYSTOLIC BLOOD PRESSURE: 117 MMHG

## 2019-10-18 LAB
ANION GAP SERPL CALCULATED.3IONS-SCNC: 7 MMOL/L (ref 5–15)
BASOPHILS # BLD AUTO: 0 K/UL (ref 0–0.2)
BASOPHILS NFR BLD AUTO: 0.1 % (ref 0–2)
BUN SERPL-MCNC: 55 MG/DL (ref 8–21)
CALCIUM SERPL-MCNC: 8.3 MG/DL (ref 8.4–11)
CHLORIDE SERPL-SCNC: 110 MMOL/L (ref 98–107)
CREAT SERPL-MCNC: 1.19 MG/DL (ref 0.55–1.3)
EOSINOPHIL # BLD AUTO: 0 K/UL (ref 0–0.4)
EOSINOPHIL NFR BLD AUTO: 0 % (ref 0–4)
ERYTHROCYTE [DISTWIDTH] IN BLOOD BY AUTOMATED COUNT: 15.3 % (ref 9–15)
GFR SERPL CREATININE-BSD FRML MDRD: (no result) ML/MIN (ref 90–?)
GLUCOSE SERPL-MCNC: 173 MG/DL (ref 70–99)
HCT VFR BLD AUTO: 34.1 % (ref 36–54)
HGB BLD-MCNC: 11.5 G/DL (ref 14–18)
LYMPHOCYTES # BLD AUTO: 0.5 K/UL (ref 1–5.5)
LYMPHOCYTES NFR BLD AUTO: 3.5 % (ref 20.5–51.5)
MCH RBC QN AUTO: 35 PG (ref 27–31)
MCHC RBC AUTO-ENTMCNC: 34 % (ref 32–36)
MCV RBC AUTO: 102 FL (ref 79–98)
MONOCYTES # BLD MANUAL: 0.3 K/UL (ref 0–1)
MONOCYTES # BLD MANUAL: 2.3 % (ref 1.7–9.3)
NEUTROPHILS # BLD AUTO: 12.7 K/UL (ref 1.8–7.7)
NEUTROPHILS NFR BLD AUTO: 94.1 % (ref 40–70)
PLATELET # BLD AUTO: 195 K/UL (ref 130–430)
POTASSIUM SERPL-SCNC: 3.6 MMOL/L (ref 3.5–5.1)
RBC # BLD AUTO: 3.33 MIL/UL (ref 4.2–6.2)
SODIUM SERPLBLD-SCNC: 143 MMOL/L (ref 136–145)
WBC # BLD AUTO: 13.5 K/UL (ref 4.8–10.8)

## 2019-10-18 RX ADMIN — IPRATROPIUM BROMIDE SCH MG: 0.5 SOLUTION RESPIRATORY (INHALATION) at 19:41

## 2019-10-18 RX ADMIN — SODIUM CHLORIDE SCH MLS/HR: 450 INJECTION, SOLUTION INTRAVENOUS at 22:35

## 2019-10-18 RX ADMIN — TAZOBACTAM SODIUM AND PIPERACILLIN SODIUM SCH MLS/HR: 375; 3 INJECTION, SOLUTION INTRAVENOUS at 05:04

## 2019-10-18 RX ADMIN — TAZOBACTAM SODIUM AND PIPERACILLIN SODIUM SCH MLS/HR: 375; 3 INJECTION, SOLUTION INTRAVENOUS at 11:57

## 2019-10-18 RX ADMIN — DOCUSATE SODIUM LIQUID SCH MG: 100 LIQUID ORAL at 09:07

## 2019-10-18 RX ADMIN — IPRATROPIUM BROMIDE SCH MG: 0.5 SOLUTION RESPIRATORY (INHALATION) at 13:06

## 2019-10-18 RX ADMIN — Medication SCH MG: at 09:12

## 2019-10-18 RX ADMIN — LEVALBUTEROL SCH MG: 1.25 SOLUTION, CONCENTRATE RESPIRATORY (INHALATION) at 19:41

## 2019-10-18 RX ADMIN — NOREPINEPHRINE BITARTRATE PRN MLS/HR: 1 INJECTION, SOLUTION, CONCENTRATE INTRAVENOUS at 09:11

## 2019-10-18 RX ADMIN — ENOXAPARIN SODIUM SCH MG: 40 INJECTION SUBCUTANEOUS at 21:43

## 2019-10-18 RX ADMIN — FAMOTIDINE SCH MG: 20 TABLET, FILM COATED ORAL at 09:07

## 2019-10-18 RX ADMIN — LEVALBUTEROL SCH MG: 1.25 SOLUTION, CONCENTRATE RESPIRATORY (INHALATION) at 00:30

## 2019-10-18 RX ADMIN — LEVALBUTEROL SCH MG: 1.25 SOLUTION, CONCENTRATE RESPIRATORY (INHALATION) at 07:53

## 2019-10-18 RX ADMIN — MUPIROCIN SCH GM: 20 OINTMENT TOPICAL at 09:08

## 2019-10-18 RX ADMIN — TAZOBACTAM SODIUM AND PIPERACILLIN SODIUM SCH MLS/HR: 375; 3 INJECTION, SOLUTION INTRAVENOUS at 17:59

## 2019-10-18 RX ADMIN — SODIUM CHLORIDE SCH MLS/HR: 450 INJECTION, SOLUTION INTRAVENOUS at 09:09

## 2019-10-18 RX ADMIN — NOREPINEPHRINE BITARTRATE PRN MLS/HR: 1 INJECTION, SOLUTION, CONCENTRATE INTRAVENOUS at 21:52

## 2019-10-18 RX ADMIN — LEVALBUTEROL SCH MG: 1.25 SOLUTION, CONCENTRATE RESPIRATORY (INHALATION) at 13:07

## 2019-10-18 RX ADMIN — METHYLPREDNISOLONE SODIUM SUCCINATE SCH MG: 125 INJECTION, POWDER, FOR SOLUTION INTRAMUSCULAR; INTRAVENOUS at 06:27

## 2019-10-18 RX ADMIN — IPRATROPIUM BROMIDE SCH MG: 0.5 SOLUTION RESPIRATORY (INHALATION) at 00:31

## 2019-10-18 RX ADMIN — METHYLPREDNISOLONE SODIUM SUCCINATE SCH MG: 125 INJECTION, POWDER, FOR SOLUTION INTRAMUSCULAR; INTRAVENOUS at 18:00

## 2019-10-18 RX ADMIN — ATORVASTATIN CALCIUM SCH MG: 20 TABLET, FILM COATED ORAL at 21:41

## 2019-10-18 RX ADMIN — IPRATROPIUM BROMIDE SCH MG: 0.5 SOLUTION RESPIRATORY (INHALATION) at 07:52

## 2019-10-18 RX ADMIN — MUPIROCIN SCH GM: 20 OINTMENT TOPICAL at 21:41

## 2019-10-18 RX ADMIN — CASTOR OIL AND BALSAM, PERU SCH GM: 788; 87 OINTMENT TOPICAL at 09:09

## 2019-10-19 VITALS — SYSTOLIC BLOOD PRESSURE: 129 MMHG

## 2019-10-19 VITALS — SYSTOLIC BLOOD PRESSURE: 137 MMHG

## 2019-10-19 VITALS — SYSTOLIC BLOOD PRESSURE: 140 MMHG

## 2019-10-19 VITALS — SYSTOLIC BLOOD PRESSURE: 138 MMHG

## 2019-10-19 VITALS — SYSTOLIC BLOOD PRESSURE: 144 MMHG

## 2019-10-19 VITALS — SYSTOLIC BLOOD PRESSURE: 113 MMHG

## 2019-10-19 VITALS — SYSTOLIC BLOOD PRESSURE: 127 MMHG

## 2019-10-19 VITALS — SYSTOLIC BLOOD PRESSURE: 136 MMHG

## 2019-10-19 VITALS — SYSTOLIC BLOOD PRESSURE: 139 MMHG

## 2019-10-19 VITALS — SYSTOLIC BLOOD PRESSURE: 124 MMHG

## 2019-10-19 VITALS — SYSTOLIC BLOOD PRESSURE: 135 MMHG

## 2019-10-19 VITALS — SYSTOLIC BLOOD PRESSURE: 149 MMHG

## 2019-10-19 VITALS — SYSTOLIC BLOOD PRESSURE: 150 MMHG

## 2019-10-19 VITALS — SYSTOLIC BLOOD PRESSURE: 147 MMHG

## 2019-10-19 VITALS — SYSTOLIC BLOOD PRESSURE: 163 MMHG

## 2019-10-19 VITALS — SYSTOLIC BLOOD PRESSURE: 132 MMHG

## 2019-10-19 VITALS — SYSTOLIC BLOOD PRESSURE: 146 MMHG

## 2019-10-19 VITALS — SYSTOLIC BLOOD PRESSURE: 161 MMHG

## 2019-10-19 VITALS — SYSTOLIC BLOOD PRESSURE: 128 MMHG

## 2019-10-19 LAB
ALBUMIN SERPL BCP-MCNC: 1.7 G/DL (ref 3.4–4.8)
ALT SERPL W P-5'-P-CCNC: 38 U/L (ref 12–78)
ANION GAP SERPL CALCULATED.3IONS-SCNC: 5 MMOL/L (ref 5–15)
AST SERPL W P-5'-P-CCNC: 18 U/L (ref 10–37)
BASOPHILS # BLD AUTO: 0 K/UL (ref 0–0.2)
BASOPHILS NFR BLD AUTO: 0.3 % (ref 0–2)
BILIRUB SERPL-MCNC: 0.6 MG/DL (ref 0–1)
BUN SERPL-MCNC: 38 MG/DL (ref 8–21)
CALCIUM SERPL-MCNC: 8 MG/DL (ref 8.4–11)
CHLORIDE SERPL-SCNC: 108 MMOL/L (ref 98–107)
CREAT SERPL-MCNC: 0.84 MG/DL (ref 0.55–1.3)
EOSINOPHIL # BLD AUTO: 0 K/UL (ref 0–0.4)
EOSINOPHIL NFR BLD AUTO: 0.2 % (ref 0–4)
ERYTHROCYTE [DISTWIDTH] IN BLOOD BY AUTOMATED COUNT: 14.9 % (ref 9–15)
GFR SERPL CREATININE-BSD FRML MDRD: (no result) ML/MIN (ref 90–?)
GLUCOSE SERPL-MCNC: 62 MG/DL (ref 70–99)
HCT VFR BLD AUTO: 34.2 % (ref 36–54)
HGB BLD-MCNC: 11.6 G/DL (ref 14–18)
LYMPHOCYTES # BLD AUTO: 0.9 K/UL (ref 1–5.5)
LYMPHOCYTES NFR BLD AUTO: 13.6 % (ref 20.5–51.5)
MCH RBC QN AUTO: 35 PG (ref 27–31)
MCHC RBC AUTO-ENTMCNC: 34 % (ref 32–36)
MCV RBC AUTO: 103 FL (ref 79–98)
MONOCYTES # BLD MANUAL: 0.2 K/UL (ref 0–1)
MONOCYTES # BLD MANUAL: 3 % (ref 1.7–9.3)
NEUTROPHILS # BLD AUTO: 5.7 K/UL (ref 1.8–7.7)
NEUTROPHILS NFR BLD AUTO: 82.9 % (ref 40–70)
PLATELET # BLD AUTO: 141 K/UL (ref 130–430)
POTASSIUM SERPL-SCNC: 3.7 MMOL/L (ref 3.5–5.1)
RBC # BLD AUTO: 3.31 MIL/UL (ref 4.2–6.2)
SODIUM SERPLBLD-SCNC: 142 MMOL/L (ref 136–145)
WBC # BLD AUTO: 6.8 K/UL (ref 4.8–10.8)

## 2019-10-19 RX ADMIN — LEVALBUTEROL SCH MG: 1.25 SOLUTION, CONCENTRATE RESPIRATORY (INHALATION) at 01:00

## 2019-10-19 RX ADMIN — IPRATROPIUM BROMIDE SCH MG: 0.5 SOLUTION RESPIRATORY (INHALATION) at 01:00

## 2019-10-19 RX ADMIN — MUPIROCIN SCH GM: 20 OINTMENT TOPICAL at 08:39

## 2019-10-19 RX ADMIN — IPRATROPIUM BROMIDE SCH MG: 0.5 SOLUTION RESPIRATORY (INHALATION) at 07:18

## 2019-10-19 RX ADMIN — TAZOBACTAM SODIUM AND PIPERACILLIN SODIUM SCH MLS/HR: 375; 3 INJECTION, SOLUTION INTRAVENOUS at 17:20

## 2019-10-19 RX ADMIN — DOCUSATE SODIUM LIQUID SCH MG: 100 LIQUID ORAL at 08:38

## 2019-10-19 RX ADMIN — LEVALBUTEROL SCH MG: 1.25 SOLUTION, CONCENTRATE RESPIRATORY (INHALATION) at 07:18

## 2019-10-19 RX ADMIN — ENOXAPARIN SODIUM SCH MG: 40 INJECTION SUBCUTANEOUS at 21:23

## 2019-10-19 RX ADMIN — SODIUM CHLORIDE SCH MLS/HR: 450 INJECTION, SOLUTION INTRAVENOUS at 10:30

## 2019-10-19 RX ADMIN — TAZOBACTAM SODIUM AND PIPERACILLIN SODIUM SCH MLS/HR: 375; 3 INJECTION, SOLUTION INTRAVENOUS at 11:59

## 2019-10-19 RX ADMIN — IPRATROPIUM BROMIDE SCH MG: 0.5 SOLUTION RESPIRATORY (INHALATION) at 19:51

## 2019-10-19 RX ADMIN — IPRATROPIUM BROMIDE SCH MG: 0.5 SOLUTION RESPIRATORY (INHALATION) at 13:17

## 2019-10-19 RX ADMIN — FAMOTIDINE SCH MG: 20 TABLET, FILM COATED ORAL at 08:39

## 2019-10-19 RX ADMIN — TAZOBACTAM SODIUM AND PIPERACILLIN SODIUM SCH MLS/HR: 375; 3 INJECTION, SOLUTION INTRAVENOUS at 06:15

## 2019-10-19 RX ADMIN — LEVALBUTEROL SCH MG: 1.25 SOLUTION, CONCENTRATE RESPIRATORY (INHALATION) at 13:17

## 2019-10-19 RX ADMIN — Medication SCH MG: at 21:20

## 2019-10-19 RX ADMIN — LEVALBUTEROL SCH MG: 1.25 SOLUTION, CONCENTRATE RESPIRATORY (INHALATION) at 19:51

## 2019-10-19 RX ADMIN — METHYLPREDNISOLONE SODIUM SUCCINATE SCH MG: 125 INJECTION, POWDER, FOR SOLUTION INTRAMUSCULAR; INTRAVENOUS at 06:16

## 2019-10-19 RX ADMIN — MUPIROCIN SCH GM: 20 OINTMENT TOPICAL at 21:22

## 2019-10-19 RX ADMIN — CASTOR OIL AND BALSAM, PERU SCH GM: 788; 87 OINTMENT TOPICAL at 08:40

## 2019-10-19 RX ADMIN — ATORVASTATIN CALCIUM SCH MG: 20 TABLET, FILM COATED ORAL at 21:20

## 2019-10-19 RX ADMIN — Medication SCH MG: at 08:39

## 2019-10-19 RX ADMIN — TAZOBACTAM SODIUM AND PIPERACILLIN SODIUM SCH MLS/HR: 375; 3 INJECTION, SOLUTION INTRAVENOUS at 00:10

## 2019-10-20 VITALS — SYSTOLIC BLOOD PRESSURE: 144 MMHG

## 2019-10-20 VITALS — SYSTOLIC BLOOD PRESSURE: 135 MMHG

## 2019-10-20 VITALS — SYSTOLIC BLOOD PRESSURE: 157 MMHG

## 2019-10-20 VITALS — SYSTOLIC BLOOD PRESSURE: 167 MMHG

## 2019-10-20 VITALS — SYSTOLIC BLOOD PRESSURE: 155 MMHG

## 2019-10-20 VITALS — SYSTOLIC BLOOD PRESSURE: 139 MMHG

## 2019-10-20 VITALS — SYSTOLIC BLOOD PRESSURE: 145 MMHG

## 2019-10-20 VITALS — SYSTOLIC BLOOD PRESSURE: 136 MMHG

## 2019-10-20 VITALS — SYSTOLIC BLOOD PRESSURE: 138 MMHG

## 2019-10-20 VITALS — SYSTOLIC BLOOD PRESSURE: 158 MMHG

## 2019-10-20 VITALS — SYSTOLIC BLOOD PRESSURE: 149 MMHG

## 2019-10-20 VITALS — SYSTOLIC BLOOD PRESSURE: 175 MMHG

## 2019-10-20 VITALS — SYSTOLIC BLOOD PRESSURE: 153 MMHG

## 2019-10-20 VITALS — SYSTOLIC BLOOD PRESSURE: 141 MMHG

## 2019-10-20 VITALS — SYSTOLIC BLOOD PRESSURE: 140 MMHG

## 2019-10-20 VITALS — SYSTOLIC BLOOD PRESSURE: 143 MMHG

## 2019-10-20 VITALS — SYSTOLIC BLOOD PRESSURE: 142 MMHG

## 2019-10-20 VITALS — SYSTOLIC BLOOD PRESSURE: 160 MMHG

## 2019-10-20 VITALS — SYSTOLIC BLOOD PRESSURE: 148 MMHG

## 2019-10-20 LAB
ANION GAP SERPL CALCULATED.3IONS-SCNC: 4 MMOL/L (ref 5–15)
BASOPHILS # BLD AUTO: 0 K/UL (ref 0–0.2)
BASOPHILS NFR BLD AUTO: 0.5 % (ref 0–2)
BUN SERPL-MCNC: 36 MG/DL (ref 8–21)
CALCIUM SERPL-MCNC: 7.9 MG/DL (ref 8.4–11)
CHLORIDE SERPL-SCNC: 106 MMOL/L (ref 98–107)
CREAT SERPL-MCNC: 0.68 MG/DL (ref 0.55–1.3)
EOSINOPHIL # BLD AUTO: 0 K/UL (ref 0–0.4)
EOSINOPHIL NFR BLD AUTO: 0.2 % (ref 0–4)
ERYTHROCYTE [DISTWIDTH] IN BLOOD BY AUTOMATED COUNT: 14.6 % (ref 9–15)
GFR SERPL CREATININE-BSD FRML MDRD: (no result) ML/MIN (ref 90–?)
GLUCOSE SERPL-MCNC: 150 MG/DL (ref 70–99)
HCT VFR BLD AUTO: 35.3 % (ref 36–54)
HGB BLD-MCNC: 12 G/DL (ref 14–18)
LYMPHOCYTES # BLD AUTO: 1.3 K/UL (ref 1–5.5)
LYMPHOCYTES NFR BLD AUTO: 17.8 % (ref 20.5–51.5)
MCH RBC QN AUTO: 35 PG (ref 27–31)
MCHC RBC AUTO-ENTMCNC: 34 % (ref 32–36)
MCV RBC AUTO: 103 FL (ref 79–98)
MONOCYTES # BLD MANUAL: 0.3 K/UL (ref 0–1)
MONOCYTES # BLD MANUAL: 3.8 % (ref 1.7–9.3)
NEUTROPHILS # BLD AUTO: 5.7 K/UL (ref 1.8–7.7)
NEUTROPHILS NFR BLD AUTO: 77.7 % (ref 40–70)
PLATELET # BLD AUTO: 140 K/UL (ref 130–430)
POTASSIUM SERPL-SCNC: 3.9 MMOL/L (ref 3.5–5.1)
RBC # BLD AUTO: 3.43 MIL/UL (ref 4.2–6.2)
SODIUM SERPLBLD-SCNC: 139 MMOL/L (ref 136–145)
WBC # BLD AUTO: 7.3 K/UL (ref 4.8–10.8)

## 2019-10-20 RX ADMIN — IPRATROPIUM BROMIDE SCH MG: 0.5 SOLUTION RESPIRATORY (INHALATION) at 07:19

## 2019-10-20 RX ADMIN — LEVALBUTEROL SCH MG: 1.25 SOLUTION, CONCENTRATE RESPIRATORY (INHALATION) at 20:01

## 2019-10-20 RX ADMIN — ATORVASTATIN CALCIUM SCH MG: 20 TABLET, FILM COATED ORAL at 21:45

## 2019-10-20 RX ADMIN — TAZOBACTAM SODIUM AND PIPERACILLIN SODIUM SCH MLS/HR: 375; 3 INJECTION, SOLUTION INTRAVENOUS at 00:15

## 2019-10-20 RX ADMIN — ENOXAPARIN SODIUM SCH MG: 40 INJECTION SUBCUTANEOUS at 21:47

## 2019-10-20 RX ADMIN — DOCUSATE SODIUM LIQUID SCH MG: 100 LIQUID ORAL at 09:32

## 2019-10-20 RX ADMIN — Medication SCH MG: at 09:32

## 2019-10-20 RX ADMIN — TAZOBACTAM SODIUM AND PIPERACILLIN SODIUM SCH MLS/HR: 375; 3 INJECTION, SOLUTION INTRAVENOUS at 18:44

## 2019-10-20 RX ADMIN — MUPIROCIN SCH GM: 20 OINTMENT TOPICAL at 21:48

## 2019-10-20 RX ADMIN — IPRATROPIUM BROMIDE SCH MG: 0.5 SOLUTION RESPIRATORY (INHALATION) at 01:00

## 2019-10-20 RX ADMIN — LEVALBUTEROL SCH MG: 1.25 SOLUTION, CONCENTRATE RESPIRATORY (INHALATION) at 01:00

## 2019-10-20 RX ADMIN — LEVALBUTEROL SCH MG: 1.25 SOLUTION, CONCENTRATE RESPIRATORY (INHALATION) at 13:24

## 2019-10-20 RX ADMIN — IPRATROPIUM BROMIDE SCH MG: 0.5 SOLUTION RESPIRATORY (INHALATION) at 13:24

## 2019-10-20 RX ADMIN — TAZOBACTAM SODIUM AND PIPERACILLIN SODIUM SCH MLS/HR: 375; 3 INJECTION, SOLUTION INTRAVENOUS at 06:34

## 2019-10-20 RX ADMIN — CASTOR OIL AND BALSAM, PERU SCH GM: 788; 87 OINTMENT TOPICAL at 09:33

## 2019-10-20 RX ADMIN — Medication SCH MG: at 21:45

## 2019-10-20 RX ADMIN — SODIUM CHLORIDE SCH MLS/HR: 450 INJECTION, SOLUTION INTRAVENOUS at 18:44

## 2019-10-20 RX ADMIN — TAZOBACTAM SODIUM AND PIPERACILLIN SODIUM SCH MLS/HR: 375; 3 INJECTION, SOLUTION INTRAVENOUS at 11:49

## 2019-10-20 RX ADMIN — IPRATROPIUM BROMIDE SCH MG: 0.5 SOLUTION RESPIRATORY (INHALATION) at 20:01

## 2019-10-20 RX ADMIN — LEVALBUTEROL SCH MG: 1.25 SOLUTION, CONCENTRATE RESPIRATORY (INHALATION) at 07:19

## 2019-10-20 RX ADMIN — FAMOTIDINE SCH MG: 20 TABLET, FILM COATED ORAL at 09:32

## 2019-10-20 RX ADMIN — MUPIROCIN SCH GM: 20 OINTMENT TOPICAL at 09:33

## 2019-10-21 VITALS — SYSTOLIC BLOOD PRESSURE: 114 MMHG

## 2019-10-21 VITALS — SYSTOLIC BLOOD PRESSURE: 109 MMHG

## 2019-10-21 VITALS — SYSTOLIC BLOOD PRESSURE: 119 MMHG

## 2019-10-21 VITALS — SYSTOLIC BLOOD PRESSURE: 118 MMHG

## 2019-10-21 VITALS — SYSTOLIC BLOOD PRESSURE: 148 MMHG

## 2019-10-21 VITALS — SYSTOLIC BLOOD PRESSURE: 146 MMHG

## 2019-10-21 VITALS — SYSTOLIC BLOOD PRESSURE: 140 MMHG

## 2019-10-21 VITALS — SYSTOLIC BLOOD PRESSURE: 134 MMHG

## 2019-10-21 VITALS — SYSTOLIC BLOOD PRESSURE: 135 MMHG

## 2019-10-21 VITALS — SYSTOLIC BLOOD PRESSURE: 112 MMHG

## 2019-10-21 VITALS — SYSTOLIC BLOOD PRESSURE: 130 MMHG

## 2019-10-21 VITALS — SYSTOLIC BLOOD PRESSURE: 159 MMHG

## 2019-10-21 VITALS — SYSTOLIC BLOOD PRESSURE: 147 MMHG

## 2019-10-21 RX ADMIN — TAZOBACTAM SODIUM AND PIPERACILLIN SODIUM SCH MLS/HR: 375; 3 INJECTION, SOLUTION INTRAVENOUS at 00:27

## 2019-10-21 RX ADMIN — Medication SCH MG: at 20:50

## 2019-10-21 RX ADMIN — IPRATROPIUM BROMIDE SCH MG: 0.5 SOLUTION RESPIRATORY (INHALATION) at 07:53

## 2019-10-21 RX ADMIN — MUPIROCIN SCH GM: 20 OINTMENT TOPICAL at 21:00

## 2019-10-21 RX ADMIN — SODIUM CHLORIDE SCH MLS/HR: 9 INJECTION, SOLUTION INTRAVENOUS at 14:17

## 2019-10-21 RX ADMIN — ATORVASTATIN CALCIUM SCH MG: 20 TABLET, FILM COATED ORAL at 20:50

## 2019-10-21 RX ADMIN — MUPIROCIN SCH GM: 20 OINTMENT TOPICAL at 09:09

## 2019-10-21 RX ADMIN — LEVALBUTEROL SCH MG: 1.25 SOLUTION, CONCENTRATE RESPIRATORY (INHALATION) at 19:35

## 2019-10-21 RX ADMIN — Medication SCH MG: at 09:09

## 2019-10-21 RX ADMIN — DOCUSATE SODIUM LIQUID SCH MG: 100 LIQUID ORAL at 09:08

## 2019-10-21 RX ADMIN — LEVALBUTEROL SCH MG: 1.25 SOLUTION, CONCENTRATE RESPIRATORY (INHALATION) at 00:46

## 2019-10-21 RX ADMIN — LEVALBUTEROL SCH MG: 1.25 SOLUTION, CONCENTRATE RESPIRATORY (INHALATION) at 13:17

## 2019-10-21 RX ADMIN — TAZOBACTAM SODIUM AND PIPERACILLIN SODIUM SCH MLS/HR: 375; 3 INJECTION, SOLUTION INTRAVENOUS at 06:26

## 2019-10-21 RX ADMIN — SODIUM CHLORIDE SCH MLS/HR: 450 INJECTION, SOLUTION INTRAVENOUS at 06:34

## 2019-10-21 RX ADMIN — LEVALBUTEROL SCH MG: 1.25 SOLUTION, CONCENTRATE RESPIRATORY (INHALATION) at 07:53

## 2019-10-21 RX ADMIN — Medication SCH MG: at 09:08

## 2019-10-21 RX ADMIN — CASTOR OIL AND BALSAM, PERU SCH GM: 788; 87 OINTMENT TOPICAL at 09:09

## 2019-10-21 RX ADMIN — DEXTROSE MONOHYDRATE SCH MLS/HR: 50 INJECTION, SOLUTION INTRAVENOUS at 12:39

## 2019-10-21 RX ADMIN — ENOXAPARIN SODIUM SCH MG: 40 INJECTION SUBCUTANEOUS at 20:54

## 2019-10-21 RX ADMIN — FAMOTIDINE SCH MG: 20 TABLET, FILM COATED ORAL at 09:08

## 2019-10-21 RX ADMIN — IPRATROPIUM BROMIDE SCH MG: 0.5 SOLUTION RESPIRATORY (INHALATION) at 00:47

## 2019-10-21 RX ADMIN — IPRATROPIUM BROMIDE SCH MG: 0.5 SOLUTION RESPIRATORY (INHALATION) at 13:17

## 2019-10-21 RX ADMIN — IPRATROPIUM BROMIDE SCH MG: 0.5 SOLUTION RESPIRATORY (INHALATION) at 19:35

## 2019-10-22 VITALS — SYSTOLIC BLOOD PRESSURE: 98 MMHG

## 2019-10-22 VITALS — SYSTOLIC BLOOD PRESSURE: 119 MMHG

## 2019-10-22 VITALS — SYSTOLIC BLOOD PRESSURE: 101 MMHG

## 2019-10-22 VITALS — SYSTOLIC BLOOD PRESSURE: 103 MMHG

## 2019-10-22 VITALS — SYSTOLIC BLOOD PRESSURE: 99 MMHG

## 2019-10-22 RX ADMIN — DOCUSATE SODIUM LIQUID SCH MG: 100 LIQUID ORAL at 08:30

## 2019-10-22 RX ADMIN — LEVALBUTEROL SCH MG: 1.25 SOLUTION, CONCENTRATE RESPIRATORY (INHALATION) at 00:50

## 2019-10-22 RX ADMIN — MUPIROCIN SCH APPLIC: 20 OINTMENT TOPICAL at 08:30

## 2019-10-22 RX ADMIN — SODIUM CHLORIDE SCH MLS/HR: 9 INJECTION, SOLUTION INTRAVENOUS at 13:05

## 2019-10-22 RX ADMIN — LEVALBUTEROL SCH MG: 1.25 SOLUTION, CONCENTRATE RESPIRATORY (INHALATION) at 13:17

## 2019-10-22 RX ADMIN — LEVALBUTEROL SCH MG: 1.25 SOLUTION, CONCENTRATE RESPIRATORY (INHALATION) at 08:07

## 2019-10-22 RX ADMIN — IPRATROPIUM BROMIDE SCH MG: 0.5 SOLUTION RESPIRATORY (INHALATION) at 00:50

## 2019-10-22 RX ADMIN — IPRATROPIUM BROMIDE SCH MG: 0.5 SOLUTION RESPIRATORY (INHALATION) at 08:06

## 2019-10-22 RX ADMIN — CASTOR OIL AND BALSAM, PERU SCH APPLIC: 788; 87 OINTMENT TOPICAL at 08:31

## 2019-10-22 RX ADMIN — SODIUM CHLORIDE SCH MLS/HR: 9 INJECTION, SOLUTION INTRAVENOUS at 00:56

## 2019-10-22 RX ADMIN — Medication SCH MG: at 08:31

## 2019-10-22 RX ADMIN — FAMOTIDINE SCH MG: 20 TABLET, FILM COATED ORAL at 08:31

## 2019-10-22 RX ADMIN — DEXTROSE MONOHYDRATE SCH MLS/HR: 50 INJECTION, SOLUTION INTRAVENOUS at 12:29

## 2019-10-22 RX ADMIN — Medication SCH MG: at 09:00

## 2019-10-22 RX ADMIN — IPRATROPIUM BROMIDE SCH MG: 0.5 SOLUTION RESPIRATORY (INHALATION) at 13:17

## 2019-12-24 ENCOUNTER — HOSPITAL ENCOUNTER (INPATIENT)
Dept: HOSPITAL 4 - SED | Age: 79
LOS: 4 days | Discharge: NURSING, REHAB OR LONG-TERM CARE | DRG: 871 | End: 2019-12-28
Attending: FAMILY MEDICINE | Admitting: FAMILY MEDICINE
Payer: COMMERCIAL

## 2019-12-24 VITALS — SYSTOLIC BLOOD PRESSURE: 87 MMHG

## 2019-12-24 VITALS — WEIGHT: 159 LBS | SYSTOLIC BLOOD PRESSURE: 118 MMHG | HEIGHT: 70 IN | BODY MASS INDEX: 22.76 KG/M2

## 2019-12-24 VITALS — SYSTOLIC BLOOD PRESSURE: 115 MMHG

## 2019-12-24 VITALS — SYSTOLIC BLOOD PRESSURE: 106 MMHG

## 2019-12-24 VITALS — SYSTOLIC BLOOD PRESSURE: 96 MMHG

## 2019-12-24 VITALS — SYSTOLIC BLOOD PRESSURE: 109 MMHG

## 2019-12-24 VITALS — SYSTOLIC BLOOD PRESSURE: 92 MMHG

## 2019-12-24 DIAGNOSIS — R09.02: ICD-10-CM

## 2019-12-24 DIAGNOSIS — I82.403: ICD-10-CM

## 2019-12-24 DIAGNOSIS — A41.9: Primary | ICD-10-CM

## 2019-12-24 DIAGNOSIS — N40.0: ICD-10-CM

## 2019-12-24 DIAGNOSIS — Z86.73: ICD-10-CM

## 2019-12-24 DIAGNOSIS — Z88.8: ICD-10-CM

## 2019-12-24 DIAGNOSIS — J44.1: ICD-10-CM

## 2019-12-24 DIAGNOSIS — Z87.891: ICD-10-CM

## 2019-12-24 DIAGNOSIS — J69.0: ICD-10-CM

## 2019-12-24 DIAGNOSIS — G93.40: ICD-10-CM

## 2019-12-24 DIAGNOSIS — I10: ICD-10-CM

## 2019-12-24 DIAGNOSIS — F03.90: ICD-10-CM

## 2019-12-24 DIAGNOSIS — Z79.899: ICD-10-CM

## 2019-12-24 DIAGNOSIS — Z93.1: ICD-10-CM

## 2019-12-24 LAB
ALBUMIN SERPL BCP-MCNC: 2.9 G/DL (ref 3.4–4.8)
ALT SERPL W P-5'-P-CCNC: 70 U/L (ref 12–78)
ANION GAP SERPL CALCULATED.3IONS-SCNC: 12 MMOL/L (ref 5–15)
AST SERPL W P-5'-P-CCNC: 29 U/L (ref 10–37)
BASOPHILS # BLD AUTO: 0 K/UL (ref 0–0.2)
BASOPHILS NFR BLD AUTO: 0.3 % (ref 0–2)
BILIRUB SERPL-MCNC: 0.5 MG/DL (ref 0–1)
BUN SERPL-MCNC: 32 MG/DL (ref 8–21)
CALCIUM SERPL-MCNC: 8.6 MG/DL (ref 8.4–11)
CHLORIDE SERPL-SCNC: 106 MMOL/L (ref 98–107)
CREAT SERPL-MCNC: 0.6 MG/DL (ref 0.55–1.3)
EOSINOPHIL # BLD AUTO: 0.2 K/UL (ref 0–0.4)
EOSINOPHIL NFR BLD AUTO: 1.3 % (ref 0–4)
ERYTHROCYTE [DISTWIDTH] IN BLOOD BY AUTOMATED COUNT: 14.3 % (ref 9–15)
GFR SERPL CREATININE-BSD FRML MDRD: (no result) ML/MIN (ref 90–?)
GLUCOSE SERPL-MCNC: 96 MG/DL (ref 70–99)
HCT VFR BLD AUTO: 43.1 % (ref 36–54)
HGB BLD-MCNC: 14.2 G/DL (ref 14–18)
INR PPP: 1 (ref 0.8–1.2)
LYMPHOCYTES # BLD AUTO: 1.3 K/UL (ref 1–5.5)
LYMPHOCYTES NFR BLD AUTO: 9.9 % (ref 20.5–51.5)
MCH RBC QN AUTO: 35 PG (ref 27–31)
MCHC RBC AUTO-ENTMCNC: 33 % (ref 32–36)
MCV RBC AUTO: 106 FL (ref 79–98)
MONOCYTES # BLD MANUAL: 0.7 K/UL (ref 0–1)
MONOCYTES # BLD MANUAL: 5.5 % (ref 1.7–9.3)
NEUTROPHILS # BLD AUTO: 10.6 K/UL (ref 1.8–7.7)
NEUTROPHILS NFR BLD AUTO: 83 % (ref 40–70)
PLATELET # BLD AUTO: 200 K/UL (ref 130–430)
POTASSIUM SERPL-SCNC: 4.1 MMOL/L (ref 3.5–5.1)
PROTHROMBIN TIME: 10.2 SECS (ref 9.5–12.5)
RBC # BLD AUTO: 4.08 MIL/UL (ref 4.2–6.2)
SODIUM SERPLBLD-SCNC: 144 MMOL/L (ref 136–145)
WBC # BLD AUTO: 12.7 K/UL (ref 4.8–10.8)

## 2019-12-24 PROCEDURE — G0378 HOSPITAL OBSERVATION PER HR: HCPCS

## 2019-12-24 RX ADMIN — RIVAROXABAN SCH MG: 10 TABLET, FILM COATED ORAL at 22:50

## 2019-12-24 RX ADMIN — ATORVASTATIN CALCIUM SCH MG: 20 TABLET, FILM COATED ORAL at 22:49

## 2019-12-24 RX ADMIN — LEVALBUTEROL SCH MG: 1.25 SOLUTION, CONCENTRATE RESPIRATORY (INHALATION) at 20:01

## 2019-12-24 RX ADMIN — DEXTROSE MONOHYDRATE SCH MLS/HR: 50 INJECTION, SOLUTION INTRAVENOUS at 17:10

## 2019-12-24 RX ADMIN — DEXTROSE MONOHYDRATE SCH MLS/HR: 50 INJECTION, SOLUTION INTRAVENOUS at 22:51

## 2019-12-24 RX ADMIN — LEVALBUTEROL SCH MG: 1.25 SOLUTION, CONCENTRATE RESPIRATORY (INHALATION) at 11:30

## 2019-12-24 RX ADMIN — ENOXAPARIN SODIUM SCH MG: 40 INJECTION SUBCUTANEOUS at 15:48

## 2019-12-24 RX ADMIN — SODIUM CHLORIDE SCH MLS/HR: 9 INJECTION, SOLUTION INTRAVENOUS at 09:45

## 2019-12-24 RX ADMIN — POTASSIUM CHLORIDE, DEXTROSE MONOHYDRATE AND SODIUM CHLORIDE SCH MLS/HR: 150; 5; 450 INJECTION, SOLUTION INTRAVENOUS at 17:11

## 2019-12-24 RX ADMIN — DOCUSATE SODIUM SCH MG: 100 CAPSULE, LIQUID FILLED ORAL at 22:49

## 2019-12-24 NOTE — NUR
ADMISSION NOTE

Received patient from ER via angela, received report from Nandini FAJARDO. Patient admitted with 
diagnosis of PNA and Sepsis. Patient oriented to hospital routine, call light, toileting and 
safety-patient verbalized understanding.

## 2019-12-24 NOTE — NUR
Pt Brought in by ambulance. EMS and facility staff state that the patient had 
had episodes of shortness of breath. Pt exhibiting acc. muscle use, 
retractions, productive cought and bilateral rales. Pt Resting in bed. No acute 
distress noted at this time

## 2019-12-24 NOTE — NUR
GT feeding started:

Start 1.5 Jevity tube feeding at 65 ml/gr with 150 ml free water Q 6 hrs as ordered.

## 2019-12-24 NOTE — NUR
Patient will be admitted to care of Dr. Shultz.  Admitted to Telemetry unit.  
Will go to room 114B.  Belongings list completed.  Complete and up to date 
summary report printed. SBAR report to be given at bedside with opportunity for 
questions.

## 2019-12-24 NOTE — NUR
Closing note:

Patient is stable, on Oxygen 3 L/M via NC, no sign of distress, on IVF the same rate, F/C 
draining well via gravity, tolerating Tube feeding well.

## 2019-12-25 VITALS — SYSTOLIC BLOOD PRESSURE: 96 MMHG

## 2019-12-25 VITALS — SYSTOLIC BLOOD PRESSURE: 121 MMHG

## 2019-12-25 VITALS — SYSTOLIC BLOOD PRESSURE: 93 MMHG

## 2019-12-25 VITALS — SYSTOLIC BLOOD PRESSURE: 99 MMHG

## 2019-12-25 VITALS — SYSTOLIC BLOOD PRESSURE: 112 MMHG

## 2019-12-25 VITALS — SYSTOLIC BLOOD PRESSURE: 104 MMHG

## 2019-12-25 LAB
ANION GAP SERPL CALCULATED.3IONS-SCNC: 7 MMOL/L (ref 5–15)
BASOPHILS # BLD AUTO: 0 K/UL (ref 0–0.2)
BASOPHILS NFR BLD AUTO: 0.3 % (ref 0–2)
BUN SERPL-MCNC: 33 MG/DL (ref 8–21)
CALCIUM SERPL-MCNC: 8.2 MG/DL (ref 8.4–11)
CHLORIDE SERPL-SCNC: 107 MMOL/L (ref 98–107)
CREAT SERPL-MCNC: 0.67 MG/DL (ref 0.55–1.3)
EOSINOPHIL # BLD AUTO: 0 K/UL (ref 0–0.4)
EOSINOPHIL NFR BLD AUTO: 0 % (ref 0–4)
ERYTHROCYTE [DISTWIDTH] IN BLOOD BY AUTOMATED COUNT: 13.9 % (ref 9–15)
GFR SERPL CREATININE-BSD FRML MDRD: (no result) ML/MIN (ref 90–?)
GLUCOSE SERPL-MCNC: 258 MG/DL (ref 70–99)
HCT VFR BLD AUTO: 36 % (ref 36–54)
HGB BLD-MCNC: 12 G/DL (ref 14–18)
LYMPHOCYTES # BLD AUTO: 0.4 K/UL (ref 1–5.5)
LYMPHOCYTES NFR BLD AUTO: 3.5 % (ref 20.5–51.5)
MCH RBC QN AUTO: 35 PG (ref 27–31)
MCHC RBC AUTO-ENTMCNC: 33 % (ref 32–36)
MCV RBC AUTO: 105 FL (ref 79–98)
MONOCYTES # BLD MANUAL: 0.4 K/UL (ref 0–1)
MONOCYTES # BLD MANUAL: 3.5 % (ref 1.7–9.3)
NEUTROPHILS # BLD AUTO: 9.8 K/UL (ref 1.8–7.7)
NEUTROPHILS NFR BLD AUTO: 92.7 % (ref 40–70)
PLATELET # BLD AUTO: 168 K/UL (ref 130–430)
POTASSIUM SERPL-SCNC: 3.7 MMOL/L (ref 3.5–5.1)
RBC # BLD AUTO: 3.42 MIL/UL (ref 4.2–6.2)
SODIUM SERPLBLD-SCNC: 141 MMOL/L (ref 136–145)
WBC # BLD AUTO: 10.6 K/UL (ref 4.8–10.8)

## 2019-12-25 RX ADMIN — DEXTROSE MONOHYDRATE SCH MLS/HR: 50 INJECTION, SOLUTION INTRAVENOUS at 04:23

## 2019-12-25 RX ADMIN — HUMAN INSULIN PRN UNITS: 100 INJECTION, SOLUTION SUBCUTANEOUS at 11:06

## 2019-12-25 RX ADMIN — Medication SCH EA: at 11:03

## 2019-12-25 RX ADMIN — Medication SCH EA: at 07:43

## 2019-12-25 RX ADMIN — LEVALBUTEROL SCH MG: 1.25 SOLUTION, CONCENTRATE RESPIRATORY (INHALATION) at 07:50

## 2019-12-25 RX ADMIN — DOCUSATE SODIUM SCH MG: 100 CAPSULE, LIQUID FILLED ORAL at 09:00

## 2019-12-25 RX ADMIN — LEVALBUTEROL SCH MG: 1.25 SOLUTION, CONCENTRATE RESPIRATORY (INHALATION) at 11:54

## 2019-12-25 RX ADMIN — FAMOTIDINE SCH MG: 20 TABLET, FILM COATED ORAL at 09:21

## 2019-12-25 RX ADMIN — LEVALBUTEROL SCH MG: 1.25 SOLUTION, CONCENTRATE RESPIRATORY (INHALATION) at 00:25

## 2019-12-25 RX ADMIN — DEXTROSE MONOHYDRATE SCH MLS/HR: 50 INJECTION, SOLUTION INTRAVENOUS at 13:34

## 2019-12-25 RX ADMIN — Medication SCH EA: at 20:34

## 2019-12-25 RX ADMIN — POTASSIUM CHLORIDE, DEXTROSE MONOHYDRATE AND SODIUM CHLORIDE SCH MLS/HR: 150; 5; 450 INJECTION, SOLUTION INTRAVENOUS at 04:23

## 2019-12-25 RX ADMIN — DEXTROSE MONOHYDRATE SCH MLS/HR: 50 INJECTION, SOLUTION INTRAVENOUS at 17:35

## 2019-12-25 RX ADMIN — HUMAN INSULIN PRN UNITS: 100 INJECTION, SOLUTION SUBCUTANEOUS at 21:25

## 2019-12-25 RX ADMIN — LEVALBUTEROL SCH MG: 1.25 SOLUTION, CONCENTRATE RESPIRATORY (INHALATION) at 15:52

## 2019-12-25 RX ADMIN — LEVALBUTEROL SCH MG: 1.25 SOLUTION, CONCENTRATE RESPIRATORY (INHALATION) at 04:59

## 2019-12-25 RX ADMIN — SODIUM CHLORIDE SCH MLS/HR: 9 INJECTION, SOLUTION INTRAVENOUS at 10:55

## 2019-12-25 RX ADMIN — ENOXAPARIN SODIUM SCH MG: 40 INJECTION SUBCUTANEOUS at 09:21

## 2019-12-25 RX ADMIN — POTASSIUM CHLORIDE, DEXTROSE MONOHYDRATE AND SODIUM CHLORIDE SCH MLS/HR: 150; 5; 450 INJECTION, SOLUTION INTRAVENOUS at 11:58

## 2019-12-25 RX ADMIN — Medication SCH MG: at 09:00

## 2019-12-25 RX ADMIN — LEVALBUTEROL SCH MG: 1.25 SOLUTION, CONCENTRATE RESPIRATORY (INHALATION) at 21:08

## 2019-12-25 RX ADMIN — Medication SCH EA: at 17:37

## 2019-12-25 RX ADMIN — DOCUSATE SODIUM SCH MG: 100 CAPSULE, LIQUID FILLED ORAL at 21:16

## 2019-12-25 RX ADMIN — ATORVASTATIN CALCIUM SCH MG: 20 TABLET, FILM COATED ORAL at 21:16

## 2019-12-25 RX ADMIN — SODIUM CHLORIDE AND POTASSIUM CHLORIDE SCH MLS/HR: .9; .15 SOLUTION INTRAVENOUS at 17:35

## 2019-12-25 RX ADMIN — LEVALBUTEROL SCH MG: 1.25 SOLUTION, CONCENTRATE RESPIRATORY (INHALATION) at 23:47

## 2019-12-25 RX ADMIN — DEXTROSE MONOHYDRATE SCH MLS/HR: 50 INJECTION, SOLUTION INTRAVENOUS at 22:48

## 2019-12-25 RX ADMIN — HUMAN INSULIN PRN UNITS: 100 INJECTION, SOLUTION SUBCUTANEOUS at 17:50

## 2019-12-25 NOTE — NUR
Medication

patient resting in bed, cleaned, turned and repositioned patient with CNA, patient tolerated 
well, held Colace GT at this time due to medication being a capsule and not being able to 
crush med, held Metopolol GT due to decreased blood pressure at this time, no residual 
output from G tube, administered Pepcid as per MD orders, patient tolerated well, continuing 
to monitor, bed in lowest position, three side rails up, bed alarm on, fall and aspiration 
precautions in place.

## 2019-12-25 NOTE — NUR
Nutrition Update



Naren Scale 13 noted.

Pt admitted for PNA and sepsis

Diet:  Jevity 1.5 at 65ml/hr and 150ml free water flush Q6h

BMI: 22.8 kg/m2



RD to follow per nutrition care standards.

## 2019-12-25 NOTE — NUR
Closing note

patient resting in bed, awake, no signs of pain, no signs of distress, all needs met, will 
endorse report to NOC shift nurse, bed in lowest position, three side rails up, bed alarm 
on, bed close to nursing station, fall and aspiration precautions in place.

## 2019-12-25 NOTE — NUR
RN rounds/Medication

patient resting in bed, eyes closed, breathing is even and unlabored, no signs of distress, 
IV zosyn hung and infusing well, IV is patient and infusing well, no other needs at this 
time, bed in lowest position, three side rails up, bed alarm on, fall and aspiration 
precautions in place.

## 2019-12-25 NOTE — NUR
RN rounds/Medication

patient resting in bed, awake, MD made aware of low BP, no further orders, patient has no 
signs of distress, IV antibiotic hung and infusing well, IV line is patent and infusing 
well, blood glucose checked and insulin coverage provided per MD orders, no other needs at 
this time, bed in lowest position, three side rails up, bed alarm on, bed close to nursing 
station, fall and aspiration precautions in place.

## 2019-12-25 NOTE — NUR
RN rounds/Medication

patient resting in bed, receiving breathing treatment, administered IV Solu-Medrol per MD 
orders, IV line is patent and infusing well, IV Vancomycin continues to infuse, continuing 
to monitor, bed in lowest position, three side rails up, bed alarm on, bed close to nursing 
station, fall and aspiration precautions in place.

## 2019-12-25 NOTE — NUR
RN rounds/Medication

patient resting in bed, eyes closed, breathing is even and unlabored, no signs of distress, 
IV Vanco hung late due to medication not being available on time, IV line is patent and 
infusing well, blood glucose checked and insulin coverage provided per MD orders, no other 
needs at this time, bed in lowest position, three side rails up, bed alarm on, bed close to 
nursing station, fall and aspiration precautions in place.

## 2019-12-25 NOTE — NUR
RN rounds

patient resting in bed, eyes closed, breathing is even and unlabored, no signs of distress, 
RT at bedside, continuing to monitor the patient, bed in lowest position, three side rails 
up, bed alarm on, bed close to nursing station, fall and aspiration precautions in place.

## 2019-12-25 NOTE — NUR
DR VELÁSQUEZ PAGED AS THE PATIENT BLOOD SUGAR 220MG/DL. WITH ORDERS AND CARRIED OUT, THE SAME 
GTUBE FEEDING ON

## 2019-12-25 NOTE — NUR
BS RECHECKED 243MG/DL, DAY SHIFT RN INFORMED THAT THE PATIENT IS ON d51/2 IVF AND NEEDS TO 
VERIFY WITH THE PHYSICIAN IF THE IVF NEEDS TO BE CONTINUED .TOLERATING FEEDING WELL.

## 2019-12-26 VITALS — SYSTOLIC BLOOD PRESSURE: 116 MMHG

## 2019-12-26 VITALS — SYSTOLIC BLOOD PRESSURE: 118 MMHG

## 2019-12-26 VITALS — SYSTOLIC BLOOD PRESSURE: 102 MMHG

## 2019-12-26 VITALS — SYSTOLIC BLOOD PRESSURE: 110 MMHG

## 2019-12-26 VITALS — SYSTOLIC BLOOD PRESSURE: 124 MMHG

## 2019-12-26 RX ADMIN — DEXTROSE MONOHYDRATE SCH MLS/HR: 50 INJECTION, SOLUTION INTRAVENOUS at 23:02

## 2019-12-26 RX ADMIN — LEVALBUTEROL SCH MG: 1.25 SOLUTION, CONCENTRATE RESPIRATORY (INHALATION) at 03:11

## 2019-12-26 RX ADMIN — RIVAROXABAN SCH MG: 10 TABLET, FILM COATED ORAL at 17:49

## 2019-12-26 RX ADMIN — DEXTROSE MONOHYDRATE SCH MLS/HR: 50 INJECTION, SOLUTION INTRAVENOUS at 05:12

## 2019-12-26 RX ADMIN — DOCUSATE SODIUM SCH MG: 100 CAPSULE, LIQUID FILLED ORAL at 09:00

## 2019-12-26 RX ADMIN — DOCUSATE SODIUM SCH MG: 100 CAPSULE, LIQUID FILLED ORAL at 20:37

## 2019-12-26 RX ADMIN — Medication SCH EA: at 20:44

## 2019-12-26 RX ADMIN — Medication SCH EA: at 17:48

## 2019-12-26 RX ADMIN — DEXTROSE MONOHYDRATE SCH MLS/HR: 50 INJECTION, SOLUTION INTRAVENOUS at 17:44

## 2019-12-26 RX ADMIN — LEVALBUTEROL SCH MG: 1.25 SOLUTION, CONCENTRATE RESPIRATORY (INHALATION) at 11:35

## 2019-12-26 RX ADMIN — ATORVASTATIN CALCIUM SCH MG: 20 TABLET, FILM COATED ORAL at 20:40

## 2019-12-26 RX ADMIN — DEXTROSE MONOHYDRATE SCH MLS/HR: 50 INJECTION, SOLUTION INTRAVENOUS at 11:13

## 2019-12-26 RX ADMIN — Medication SCH MG: at 09:15

## 2019-12-26 RX ADMIN — LEVALBUTEROL SCH MG: 1.25 SOLUTION, CONCENTRATE RESPIRATORY (INHALATION) at 23:20

## 2019-12-26 RX ADMIN — SODIUM CHLORIDE AND POTASSIUM CHLORIDE SCH MLS/HR: .9; .15 SOLUTION INTRAVENOUS at 14:28

## 2019-12-26 RX ADMIN — Medication SCH EA: at 06:52

## 2019-12-26 RX ADMIN — SODIUM CHLORIDE SCH MLS/HR: 9 INJECTION, SOLUTION INTRAVENOUS at 20:40

## 2019-12-26 RX ADMIN — GUAIFENESIN AND DEXTROMETHORPHAN SCH ML: 100; 10 SYRUP ORAL at 15:15

## 2019-12-26 RX ADMIN — Medication SCH EA: at 11:17

## 2019-12-26 RX ADMIN — LEVALBUTEROL SCH MG: 1.25 SOLUTION, CONCENTRATE RESPIRATORY (INHALATION) at 23:18

## 2019-12-26 RX ADMIN — LEVALBUTEROL SCH MG: 1.25 SOLUTION, CONCENTRATE RESPIRATORY (INHALATION) at 07:55

## 2019-12-26 RX ADMIN — HUMAN INSULIN PRN UNITS: 100 INJECTION, SOLUTION SUBCUTANEOUS at 11:19

## 2019-12-26 RX ADMIN — FAMOTIDINE SCH MG: 20 TABLET, FILM COATED ORAL at 09:15

## 2019-12-26 RX ADMIN — HUMAN INSULIN PRN UNITS: 100 INJECTION, SOLUTION SUBCUTANEOUS at 07:03

## 2019-12-26 RX ADMIN — GUAIFENESIN AND DEXTROMETHORPHAN SCH ML: 100; 10 SYRUP ORAL at 23:01

## 2019-12-26 RX ADMIN — SODIUM CHLORIDE AND POTASSIUM CHLORIDE SCH MLS/HR: .9; .15 SOLUTION INTRAVENOUS at 03:10

## 2019-12-26 NOTE — NUR
MEDICATIONS/ROUNDS

Patient is resting in bed, awake, breathing evenly and nonlabored. RT at bedside. GT 
dressing changed. Educated patient on medications, patient said "yea." Administered 
medications, patient tolerated them well. No other needs at this time. No s/s of distress at 
this time. Fall/safety/aspiration precautions.

## 2019-12-26 NOTE — NUR
Medication

patient resting in bed, eyes closed, breathing is even and unlabored, no signs of distress, 
held Colace GT at this time due to medication being a capsule and not being able to crush 
med, no residual output from G tube, patient tolerated medication administration well, 
continuing to monitor, bed in lowest position, three side rails up, bed alarm on, fall and 
aspiration precautions in place.

## 2019-12-26 NOTE — NUR
MEDICATIONS/ROUNDS

Patient is resting in bed, awake, breathing evenly and nonlabored. BS checked, no coverage 
needed at this time. Educated patient on medications, patient said "ok." Administered 
medications, patient tolerated them well. No other needs at this time. No s/s of distress at 
this time. Fall/safety/aspiration precautions.

## 2019-12-26 NOTE — NUR
RN rounds/Medication

patient resting in bed, awake, IV antibiotic hung and infusing well, IV line is patent and 
infusing well, blood glucose checked and no insulin coverage provided per MD orders, no 
residual output from G tube, administered blood thinner at this time, no other needs at this 
time, bed in lowest position, three side rails up, bed alarm on, bed close to nursing 
station, fall and aspiration precautions in place.

## 2019-12-26 NOTE — NUR
RN Rounds/Dr. Shultz rounds

patient resting in bed, awake, new tubefeeding hung and infusing, no residual output from G 
tube at this time, IV fluids hung and infusing well, IV line is patent, no s/s of 
infiltration, continuing to monitor, bed in lowest position, three side rails up, bed alarm 
on, bed close to nursing station, fall and aspiration precautions in place. Dr. Shultz 
rounded on the patient, will follow up with any new orders. 

Waiting on pharmacy to deliver Robitussin at this time, will follow up as needed. 

-------------------------------------------------------------------------------

Addendum: 12/26/19 at 1524 by Yang Estrada RN

-------------------------------------------------------------------------------

Robitussin administered per MD orders, no residual output from G tube, patient tolerated 
well.

## 2019-12-26 NOTE — NUR
OPENING NOTE

Received bedside report from SCOTTY Soria. Patient resting in bed, awake, alert and oriented x 1, 
reoriented to place, time and event, patient denies pain, no s/s of distress at this time. 
Patient has an IV on right forearm 18g, patent and benign, no s/s of infection or 
infiltration at this time, IVF running. Patient is on 3L via nasal cannula O2 saturation is 
97% at this time, patient has G tube, no residual output at this time, GT feeding running. 
Patient has a Meza Catheter secured, draining to gravity. Will continue to monitor patient, 
bed is at lowest position, three side rails up, bed alarm is on, and close to nursing 
station, fall/safety and aspiration precautions in place.

-------------------------------------------------------------------------------

Addendum: 12/26/19 at 2226 by Tra House RN

-------------------------------------------------------------------------------

Educated patient on plan of care and call light system, patient just said "yea."

-------------------------------------------------------------------------------

Addendum: 12/27/19 at 0028 by Tra House RN

-------------------------------------------------------------------------------

IV on left hand is infiltrated, discontinued IV, patient tolerated it well.

## 2019-12-26 NOTE — NUR
RN rounds/Medication

patient resting in bed, awake, denies pain, no signs of distress, IV antibiotic hung and 
infusing well, IV line is patent and infusing well, blood glucose checked and insulin 
coverage provided per MD orders, no other needs at this time, bed in lowest position, three 
side rails up, bed alarm on, bed close to nursing station, fall and aspiration precautions 
in place.

## 2019-12-26 NOTE — NUR
ROUNDS

Patient is resting in bed, awake, breathing evenly and nonlabored. No other needs at this 
time. No s/s of distress at this time. Fall/safety/aspiration precautions.

## 2019-12-26 NOTE — NUR
COUGH MEDICATION DUE NOT AVAILABLE. REPORT GIVEN TO THE DAY SHIFT RN FOLLOW UP WITH THE 
MEDICATION IN THE PHARMACY,STILL NOTED HARD NON PRODUCTIVE COUGH

## 2019-12-26 NOTE — NUR
Nutrition Assessment (short note d/t lack of time)



Admit Dx:  Pneumonia, Sepsis

A- RD reviewed pt's current EMR including diet Hx, physician notes, nursing notes, pertinent 
labs/meds/procedures, care trends and care activity. 

RD Notification received. Patient seen in bed, EN infusing per MD orders. 188ml infused at 
time of visit earlier today. Per hard chart review, pt on Jevity 1.5 at 65ml/hr x20hrs at 
[facility] which provides 1950 kcal/day. RD also noted elevated BG lab values (258H, POC BG 196H). 
Current EN regimen provides 2340 kcal, 99 gm protein and 1786ml free water daily and meets 
adequate nutrition. 



Current Nutrition Support: Jevity 1.5 at 65ml/hr, FWF 150ml Q6H via GT



Ht: 5'10

Wt: 159#/ 72 kg

BMI: 22.8 kg/m2

%IBW:  96

IBW: 166#/ 75 kg



ESTIMATED NUTRITIONAL REQUIREMENTS

CALORIES/DAY: 0346-5167 kcal/day (30-35 kcal/kg CBW for COPD)

PROTEIN/DAY: 72-86 g,/day (1-1.2 gmkg CBW for Geriatric maintenance)

FLUID/DAY: 1.8 L/day (25ml/kg CBW for Geriatric maintenance)



D: Altered nutrition-related labs r/t medication AEB steroid use and elevated BG and POC BG 
lab values. 



I: Recommend continuing Jevity 1.5 at 65ml/hr, FWF 150ml Q6H via GT

Provides: 2340 kcal, 99 gm protein and 1786ml free water daily.

Meets: 93% of upper end of est calorie needs and 115% of upper end of est protein needs.

 

M: Monitor EN tolerance and intake w/ goal of pt meeting at least 75% of estimated 
nutritional needs, labs trending WNL, normal GI function, skin integrity/wt maintenance.



E: RD to F/U within 2-3 days

CARLOS PATEL

## 2019-12-26 NOTE — NUR
patient heart rate 120 . vital signs check and recorded, tylenol dose given for pain and 
will infom Dr Henning about the heart rate and patient inability to sleep.

## 2019-12-26 NOTE — NUR
Dietitian Recommendation



   Recommend continuing Jevity 1.5 at 65ml/hr, FWF 150ml Q6H via GT

Provides: 2340 kcal, 99 gm protein and 1786ml free water daily.

Meets: 93% of upper end of est calorie needs and 115% of upper end of est protein needs.

 

group home, RD

## 2019-12-26 NOTE — NUR
Opening note

patient resting in bed, a/ox1, reoriented to place, time and event, no signs of pain, no 
signs of distress, patient becomes irritable at times, assessment complete, IV line is 
patent and infusing well, patient is on 3L via nasal cannula O2 saturation is 99% at this 
time, patient has G tube, no residual output at this time, Meza Catheter draining to 
gravity, continuing to monitor patient, bed in lowest position, three side rails up, bed 
alarm on, bed close to nursing station, fall and aspiration precautions in place.

## 2019-12-26 NOTE — NUR
Closing note

patient resting in bed, awake, no signs of distress, all needs met, will endorse report, bed 
in lowest position, three side rails up, bed alarm on, fall,isolation and aspiration 
precautions in place. 

-------------------------------------------------------------------------------

Addendum: 12/26/19 at 1856 by Yang Estrada RN

-------------------------------------------------------------------------------

wrong patient 



patient resting in bed, no signs of distress, will endorse report.

## 2019-12-27 VITALS — SYSTOLIC BLOOD PRESSURE: 117 MMHG

## 2019-12-27 VITALS — SYSTOLIC BLOOD PRESSURE: 135 MMHG

## 2019-12-27 VITALS — SYSTOLIC BLOOD PRESSURE: 107 MMHG

## 2019-12-27 VITALS — SYSTOLIC BLOOD PRESSURE: 122 MMHG

## 2019-12-27 VITALS — SYSTOLIC BLOOD PRESSURE: 130 MMHG

## 2019-12-27 RX ADMIN — GUAIFENESIN AND DEXTROMETHORPHAN SCH ML: 100; 10 SYRUP ORAL at 05:50

## 2019-12-27 RX ADMIN — SODIUM CHLORIDE SCH MLS/HR: 9 INJECTION, SOLUTION INTRAVENOUS at 21:56

## 2019-12-27 RX ADMIN — LEVALBUTEROL SCH MG: 1.25 SOLUTION, CONCENTRATE RESPIRATORY (INHALATION) at 10:21

## 2019-12-27 RX ADMIN — Medication SCH EA: at 21:59

## 2019-12-27 RX ADMIN — SODIUM CHLORIDE AND POTASSIUM CHLORIDE SCH MLS/HR: .9; .15 SOLUTION INTRAVENOUS at 21:55

## 2019-12-27 RX ADMIN — RIVAROXABAN SCH MG: 10 TABLET, FILM COATED ORAL at 16:27

## 2019-12-27 RX ADMIN — DOCUSATE SODIUM SCH MG: 100 CAPSULE, LIQUID FILLED ORAL at 21:56

## 2019-12-27 RX ADMIN — Medication SCH EA: at 06:04

## 2019-12-27 RX ADMIN — SODIUM CHLORIDE SCH MLS/HR: 9 INJECTION, SOLUTION INTRAVENOUS at 10:30

## 2019-12-27 RX ADMIN — LEVALBUTEROL SCH MG: 1.25 SOLUTION, CONCENTRATE RESPIRATORY (INHALATION) at 20:10

## 2019-12-27 RX ADMIN — Medication SCH EA: at 11:30

## 2019-12-27 RX ADMIN — LEVALBUTEROL SCH MG: 1.25 SOLUTION, CONCENTRATE RESPIRATORY (INHALATION) at 15:00

## 2019-12-27 RX ADMIN — DEXTROSE MONOHYDRATE SCH MLS/HR: 50 INJECTION, SOLUTION INTRAVENOUS at 23:32

## 2019-12-27 RX ADMIN — LEVALBUTEROL SCH MG: 1.25 SOLUTION, CONCENTRATE RESPIRATORY (INHALATION) at 16:30

## 2019-12-27 RX ADMIN — DEXTROSE MONOHYDRATE SCH MLS/HR: 50 INJECTION, SOLUTION INTRAVENOUS at 05:51

## 2019-12-27 RX ADMIN — LEVALBUTEROL SCH MG: 1.25 SOLUTION, CONCENTRATE RESPIRATORY (INHALATION) at 02:49

## 2019-12-27 RX ADMIN — GUAIFENESIN AND DEXTROMETHORPHAN SCH ML: 100; 10 SYRUP ORAL at 16:45

## 2019-12-27 RX ADMIN — Medication SCH EA: at 16:39

## 2019-12-27 RX ADMIN — DEXTROSE MONOHYDRATE SCH MLS/HR: 50 INJECTION, SOLUTION INTRAVENOUS at 11:04

## 2019-12-27 RX ADMIN — SODIUM CHLORIDE AND POTASSIUM CHLORIDE SCH MLS/HR: .9; .15 SOLUTION INTRAVENOUS at 01:56

## 2019-12-27 RX ADMIN — ATORVASTATIN CALCIUM SCH MG: 20 TABLET, FILM COATED ORAL at 21:56

## 2019-12-27 RX ADMIN — FAMOTIDINE SCH MG: 20 TABLET, FILM COATED ORAL at 10:31

## 2019-12-27 RX ADMIN — DEXTROSE MONOHYDRATE SCH MLS/HR: 50 INJECTION, SOLUTION INTRAVENOUS at 16:46

## 2019-12-27 RX ADMIN — SODIUM CHLORIDE AND POTASSIUM CHLORIDE SCH MLS/HR: .9; .15 SOLUTION INTRAVENOUS at 11:03

## 2019-12-27 RX ADMIN — GUAIFENESIN AND DEXTROMETHORPHAN SCH ML: 100; 10 SYRUP ORAL at 21:57

## 2019-12-27 RX ADMIN — Medication SCH MG: at 10:31

## 2019-12-27 RX ADMIN — DOCUSATE SODIUM SCH MG: 100 CAPSULE, LIQUID FILLED ORAL at 10:31

## 2019-12-27 NOTE — NUR
rounds

struggled with the patient to take his accucheck but refused. bed to the lowest position and 
side rails up and locked. no sob noted.

## 2019-12-27 NOTE — NUR
OPENING NOTES

Received bedside report from SCOTTY Shanks. Patient is alert, awake, oriented x1. Patient is 
breathing evenly and nonlabored on 3L NC. Patient has an IV on right forearm 18g, patent and 
benign, no s/s of infiltration or infection noted, IVF running, GT running, patient is 
tolerating it well. Patient has a Meza catheter secured and draining by gravity. Educated 
patient on plan of care, call light system, and fall/safety/aspiration precautions, patient 
said "ok." Bed is at lowest position, locked and armed. Will continue to monitor.

## 2019-12-27 NOTE — NUR
initial notes

rec patient awake and with periods of confusion. pt is verbally abusive as well. resop easy 
and unlabored. no sob noted,  bed to the lowest position and side rails up and locked. will 
continue to monitor patient.

## 2019-12-27 NOTE — NUR
MEDICATIONS/ROUNDS

Patient is resting in bed, awake, breathing evenly and nonlabored. Educated patient on 
medications, patient said "ok." Administered medications, patient tolerated them well. No 
other needs at this time. No s/s of distress at this time. Fall/safety/aspiration 
precautions.

## 2019-12-27 NOTE — NUR
ROUNDS

Patient is resting in bed, awake, breathing evenly and nonlabored. Hygiene care performed. 
No s/s of distress at this time. No other needs at this time. Fall/safety/aspiration 
precautions.

## 2019-12-27 NOTE — NUR
CLOSING NOTES

Patient is resting in bed, awake, breathing evenly and nonlabored. BS checked, no coverage 
needed. IVF running, no s/s of infection or infiltration on IV line noted, GT feeding 
running, patient is tolerating it well. Educated patient on medications, patient stated 
"ok," administered medications, patient is tolerating them well. Will endorse care to 
morning shift RN. Fall/safety/aspiration precautions.

## 2019-12-27 NOTE — NUR
MEDICATIONS/ROUNDS

Patient is resting in bed, awake, breathing evenly and nonlabored. BS checked, no coverage 
needed at this time. Educated patient on medications, patient said "ok." Administered 
medications, patient tolerated them well. No other needs at this time. No s/s of distress at 
this time. Fall/safety/aspiration precautions. Will continue to monitor.

## 2019-12-27 NOTE — NUR
ROUNDS

Patient is resting in bed, awake, breathing evenly and nonlabored. IVF running, GT feeding 
running, patient is tolerating it well. No s/s of distress at this time. No other needs at 
this time. Fall/safety/aspiration precautions.

## 2019-12-27 NOTE — NUR
rounds

sleeps at intervals and watching tv in between. with periods of confusion. bed to the lowest 
position and side rails up and locked.

## 2019-12-27 NOTE — NUR
ROUNDS

Patient is resting in bed, awake, breathing evenly and nonlabored. Replaced IVF, IVF 
running, patient is tolerating it well. No s/s of distress at this time. No other needs at 
this time. Fall/safety/aspiration precautions.

## 2019-12-27 NOTE — NUR
closing notes

pt sleeping at this time after was cleaned and repositioned. no hypo hyperglycemic reaction 
noted. was able to give meds through the gt. bed to the lowest positon and side rails up and 
locked. stable and needs attended.

## 2019-12-28 VITALS — SYSTOLIC BLOOD PRESSURE: 111 MMHG

## 2019-12-28 VITALS — SYSTOLIC BLOOD PRESSURE: 120 MMHG

## 2019-12-28 VITALS — SYSTOLIC BLOOD PRESSURE: 112 MMHG

## 2019-12-28 VITALS — SYSTOLIC BLOOD PRESSURE: 118 MMHG

## 2019-12-28 LAB
ALBUMIN SERPL BCP-MCNC: 2.2 G/DL (ref 3.4–4.8)
ALT SERPL W P-5'-P-CCNC: 66 U/L (ref 12–78)
ANION GAP SERPL CALCULATED.3IONS-SCNC: 6 MMOL/L (ref 5–15)
AST SERPL W P-5'-P-CCNC: 26 U/L (ref 10–37)
BILIRUB SERPL-MCNC: 0.3 MG/DL (ref 0–1)
BUN SERPL-MCNC: 31 MG/DL (ref 8–21)
CALCIUM SERPL-MCNC: 7.8 MG/DL (ref 8.4–11)
CHLORIDE SERPL-SCNC: 112 MMOL/L (ref 98–107)
CREAT SERPL-MCNC: 0.59 MG/DL (ref 0.55–1.3)
GFR SERPL CREATININE-BSD FRML MDRD: (no result) ML/MIN (ref 90–?)
GLUCOSE SERPL-MCNC: 150 MG/DL (ref 70–99)
POTASSIUM SERPL-SCNC: 4.4 MMOL/L (ref 3.5–5.1)
SODIUM SERPLBLD-SCNC: 146 MMOL/L (ref 136–145)

## 2019-12-28 RX ADMIN — Medication SCH EA: at 06:17

## 2019-12-28 RX ADMIN — LEVALBUTEROL SCH MG: 1.25 SOLUTION, CONCENTRATE RESPIRATORY (INHALATION) at 11:19

## 2019-12-28 RX ADMIN — Medication SCH EA: at 12:31

## 2019-12-28 RX ADMIN — LEVALBUTEROL SCH MG: 1.25 SOLUTION, CONCENTRATE RESPIRATORY (INHALATION) at 07:16

## 2019-12-28 RX ADMIN — Medication SCH MG: at 08:41

## 2019-12-28 RX ADMIN — DEXTROSE MONOHYDRATE SCH MLS/HR: 50 INJECTION, SOLUTION INTRAVENOUS at 05:38

## 2019-12-28 RX ADMIN — GUAIFENESIN AND DEXTROMETHORPHAN SCH ML: 100; 10 SYRUP ORAL at 14:22

## 2019-12-28 RX ADMIN — FAMOTIDINE SCH MG: 20 TABLET, FILM COATED ORAL at 08:41

## 2019-12-28 RX ADMIN — LEVALBUTEROL SCH MG: 1.25 SOLUTION, CONCENTRATE RESPIRATORY (INHALATION) at 02:22

## 2019-12-28 RX ADMIN — SODIUM CHLORIDE AND POTASSIUM CHLORIDE SCH MLS/HR: .9; .15 SOLUTION INTRAVENOUS at 12:29

## 2019-12-28 RX ADMIN — DEXTROSE MONOHYDRATE SCH MLS/HR: 50 INJECTION, SOLUTION INTRAVENOUS at 12:30

## 2019-12-28 RX ADMIN — LEVALBUTEROL SCH MG: 1.25 SOLUTION, CONCENTRATE RESPIRATORY (INHALATION) at 15:34

## 2019-12-28 RX ADMIN — SODIUM CHLORIDE SCH MLS/HR: 9 INJECTION, SOLUTION INTRAVENOUS at 08:41

## 2019-12-28 RX ADMIN — DEXTROSE MONOHYDRATE SCH MLS/HR: 50 INJECTION, SOLUTION INTRAVENOUS at 17:13

## 2019-12-28 RX ADMIN — HUMAN INSULIN PRN UNITS: 100 INJECTION, SOLUTION SUBCUTANEOUS at 12:31

## 2019-12-28 RX ADMIN — LEVALBUTEROL SCH MG: 1.25 SOLUTION, CONCENTRATE RESPIRATORY (INHALATION) at 02:21

## 2019-12-28 RX ADMIN — DOCUSATE SODIUM SCH MG: 100 CAPSULE, LIQUID FILLED ORAL at 08:41

## 2019-12-28 RX ADMIN — Medication SCH EA: at 17:15

## 2019-12-28 RX ADMIN — GUAIFENESIN AND DEXTROMETHORPHAN SCH ML: 100; 10 SYRUP ORAL at 05:39

## 2019-12-28 NOTE — NUR
Initial notes:

Patient sleeping. Stable. I.V. access patent. Gtube in placed. Call light within reach. 
Safety measures in placed.Report received at bedside.

## 2019-12-28 NOTE — NUR
ROUNDS

Patient is resting in bed, eyes closed, breathing evenly and nonlabored. GT feeding and line 
changed. No s/s of distress at this time. No other needs at this time. 
Fall/safety/aspiration precautions.

## 2019-12-28 NOTE — NUR
CLOSING NOTES

Patient is resting in bed, awake, breathing evenly and nonlabored. Educated patient on 
medications, patient didn't respond. Administered medications, patient tolerated them well. 
Patient continues to be uncooperative with care, yelling and screaming, but denies any pain 
at this time. Will endorse care to morning shift nurse. No other needs at this time. No s/s 
of distress at this time. Fall/safety/aspiration precautions.

-------------------------------------------------------------------------------

Addendum: 12/28/19 at 0612 by Tra House RN

-------------------------------------------------------------------------------

GT dressing change done.

## 2019-12-28 NOTE — NUR
DC Planning: s/w David at [facility], the pt is on 7 days bed hold. The pt may return back 
to room 5C when discharges from [facility].-- SCOTTY Gallegos made aware.

## 2019-12-28 NOTE — NUR
ROUNDS

Patient is resting in bed, awake, breathing evenly and nonlabored. IVF running, GT feeding 
running, patient is tolerating it well. Patient denies any pain at this time. No s/s of 
distress at this time. No other needs at this time. Fall/safety/aspiration precautions.

## 2019-12-28 NOTE — NUR
PT TRANSFERRED to [facility]

Report given to SCOTTY Swan. Transfer packet with Transfer Orders and Medication 
Reconciliation form given to EMT with report. Exitcare provided. [facility] ID band removed, 
replaced with ID band with pt's name and .  IV catheter removed, intact and dressing 
applied, no active bleeding. All belongings sent with patient. Patient left floor via gurney 
escorted by EMT in no distress.

## 2019-12-28 NOTE — NUR
ROUNDS

Patient is resting in bed, awake, breathing evenly and nonlabored. Patient's IV on the right 
forearm 18g became infiltrated, discontinued the IV and restarted on another site on the 
right forearm 22g. Patient tolerated the procedure well. No s/s of infection or infiltration 
on new IV site, IVF restarted, patient tolerated it well. No s/s of distress at this time. 
No other needs at this time. Fall/safety/aspiration precautions.

## 2019-12-28 NOTE — NUR
Family Informed:

Santos Dale Griffiths, brother of patient, informed of the transfer to [facility].

## 2020-01-18 ENCOUNTER — HOSPITAL ENCOUNTER (INPATIENT)
Dept: HOSPITAL 4 - SED | Age: 80
LOS: 6 days | Discharge: NURSING, REHAB OR LONG-TERM CARE | DRG: 871 | End: 2020-01-24
Attending: FAMILY MEDICINE | Admitting: FAMILY MEDICINE
Payer: COMMERCIAL

## 2020-01-18 VITALS — SYSTOLIC BLOOD PRESSURE: 93 MMHG

## 2020-01-18 VITALS — SYSTOLIC BLOOD PRESSURE: 82 MMHG

## 2020-01-18 VITALS — SYSTOLIC BLOOD PRESSURE: 101 MMHG

## 2020-01-18 VITALS — SYSTOLIC BLOOD PRESSURE: 125 MMHG

## 2020-01-18 VITALS — HEIGHT: 70 IN | WEIGHT: 156 LBS | BODY MASS INDEX: 22.33 KG/M2 | SYSTOLIC BLOOD PRESSURE: 97 MMHG

## 2020-01-18 VITALS — SYSTOLIC BLOOD PRESSURE: 92 MMHG

## 2020-01-18 VITALS — SYSTOLIC BLOOD PRESSURE: 98 MMHG

## 2020-01-18 VITALS — SYSTOLIC BLOOD PRESSURE: 95 MMHG

## 2020-01-18 VITALS — SYSTOLIC BLOOD PRESSURE: 97 MMHG

## 2020-01-18 VITALS — SYSTOLIC BLOOD PRESSURE: 88 MMHG

## 2020-01-18 VITALS — SYSTOLIC BLOOD PRESSURE: 136 MMHG

## 2020-01-18 VITALS — SYSTOLIC BLOOD PRESSURE: 104 MMHG

## 2020-01-18 VITALS — SYSTOLIC BLOOD PRESSURE: 114 MMHG

## 2020-01-18 VITALS — SYSTOLIC BLOOD PRESSURE: 91 MMHG

## 2020-01-18 VITALS — SYSTOLIC BLOOD PRESSURE: 96 MMHG

## 2020-01-18 VITALS — SYSTOLIC BLOOD PRESSURE: 99 MMHG

## 2020-01-18 VITALS — SYSTOLIC BLOOD PRESSURE: 90 MMHG

## 2020-01-18 VITALS — SYSTOLIC BLOOD PRESSURE: 108 MMHG
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
## 2020-01-18 DIAGNOSIS — Z86.718: ICD-10-CM

## 2020-01-18 DIAGNOSIS — E46: ICD-10-CM

## 2020-01-18 DIAGNOSIS — Z93.1: ICD-10-CM

## 2020-01-18 DIAGNOSIS — D64.9: ICD-10-CM

## 2020-01-18 DIAGNOSIS — Z87.891: ICD-10-CM

## 2020-01-18 DIAGNOSIS — E11.65: ICD-10-CM

## 2020-01-18 DIAGNOSIS — A41.1: Primary | ICD-10-CM

## 2020-01-18 DIAGNOSIS — I10: ICD-10-CM

## 2020-01-18 DIAGNOSIS — Z86.73: ICD-10-CM

## 2020-01-18 DIAGNOSIS — Z79.52: ICD-10-CM

## 2020-01-18 DIAGNOSIS — I48.92: ICD-10-CM

## 2020-01-18 DIAGNOSIS — Z79.899: ICD-10-CM

## 2020-01-18 DIAGNOSIS — E78.5: ICD-10-CM

## 2020-01-18 DIAGNOSIS — J69.0: ICD-10-CM

## 2020-01-18 DIAGNOSIS — J44.9: ICD-10-CM

## 2020-01-18 DIAGNOSIS — N40.0: ICD-10-CM

## 2020-01-18 DIAGNOSIS — Z22.322: ICD-10-CM

## 2020-01-18 DIAGNOSIS — F03.90: ICD-10-CM

## 2020-01-18 DIAGNOSIS — Z74.01: ICD-10-CM

## 2020-01-18 DIAGNOSIS — R65.21: ICD-10-CM

## 2020-01-18 DIAGNOSIS — Z88.8: ICD-10-CM

## 2020-01-18 DIAGNOSIS — J96.21: ICD-10-CM

## 2020-01-18 DIAGNOSIS — Z79.01: ICD-10-CM

## 2020-01-18 DIAGNOSIS — I48.0: ICD-10-CM

## 2020-01-18 DIAGNOSIS — G93.40: ICD-10-CM

## 2020-01-18 LAB
ALBUMIN SERPL BCP-MCNC: 2.8 G/DL (ref 3.4–4.8)
ALT SERPL W P-5'-P-CCNC: 39 U/L (ref 12–78)
ANION GAP SERPL CALCULATED.3IONS-SCNC: 9 MMOL/L (ref 5–15)
APPEARANCE UR: CLEAR
AST SERPL W P-5'-P-CCNC: 26 U/L (ref 10–37)
BACTERIA URNS QL MICRO: (no result) /HPF
BASOPHILS # BLD AUTO: 0 K/UL (ref 0–0.2)
BASOPHILS NFR BLD AUTO: 0.2 % (ref 0–2)
BILIRUB SERPL-MCNC: 0.6 MG/DL (ref 0–1)
BILIRUB UR QL STRIP: NEGATIVE
BUN SERPL-MCNC: 25 MG/DL (ref 8–21)
CALCIUM SERPL-MCNC: 8.3 MG/DL (ref 8.4–11)
CHLORIDE SERPL-SCNC: 102 MMOL/L (ref 98–107)
COLOR UR: YELLOW
CREAT SERPL-MCNC: 0.77 MG/DL (ref 0.55–1.3)
EOSINOPHIL # BLD AUTO: 0 K/UL (ref 0–0.4)
EOSINOPHIL NFR BLD AUTO: 0.1 % (ref 0–4)
ERYTHROCYTE [DISTWIDTH] IN BLOOD BY AUTOMATED COUNT: 14.6 % (ref 9–15)
GFR SERPL CREATININE-BSD FRML MDRD: (no result) ML/MIN (ref 90–?)
GLUCOSE SERPL-MCNC: 142 MG/DL (ref 70–99)
GLUCOSE UR STRIP-MCNC: NEGATIVE MG/DL
HCT VFR BLD AUTO: 43.1 % (ref 36–54)
HGB BLD-MCNC: 14.5 G/DL (ref 14–18)
HGB UR QL STRIP: (no result)
INR PPP: 1.3 (ref 0.8–1.2)
KETONES UR STRIP-MCNC: (no result) MG/DL
LEUKOCYTE ESTERASE UR QL STRIP: NEGATIVE
LYMPHOCYTES # BLD AUTO: 0.7 K/UL (ref 1–5.5)
LYMPHOCYTES NFR BLD AUTO: 6.1 % (ref 20.5–51.5)
MCH RBC QN AUTO: 35 PG (ref 27–31)
MCHC RBC AUTO-ENTMCNC: 34 % (ref 32–36)
MCV RBC AUTO: 102 FL (ref 79–98)
MONOCYTES # BLD MANUAL: 0.7 K/UL (ref 0–1)
MONOCYTES # BLD MANUAL: 6.3 % (ref 1.7–9.3)
NEUTROPHILS # BLD AUTO: 10.3 K/UL (ref 1.8–7.7)
NEUTROPHILS NFR BLD AUTO: 87.3 % (ref 40–70)
NITRITE UR QL STRIP: NEGATIVE
PH UR STRIP: 5.5 [PH] (ref 5–8)
PLATELET # BLD AUTO: 182 K/UL (ref 130–430)
POTASSIUM SERPL-SCNC: 3.9 MMOL/L (ref 3.5–5.1)
PROT UR QL STRIP: (no result)
PROTHROMBIN TIME: 12.8 SECS (ref 9.5–12.5)
RBC # BLD AUTO: 4.21 MIL/UL (ref 4.2–6.2)
RBC #/AREA URNS HPF: (no result) /HPF (ref 0–3)
SODIUM SERPLBLD-SCNC: 138 MMOL/L (ref 136–145)
SP GR UR STRIP: >=1.03 (ref 1–1.03)
UROBILINOGEN UR STRIP-MCNC: 0.2 MG/DL (ref 0.2–1)
WBC # BLD AUTO: 11.8 K/UL (ref 4.8–10.8)
WBC #/AREA URNS HPF: (no result) /HPF (ref 0–3)

## 2020-01-18 RX ADMIN — DEXTROSE, SODIUM CHLORIDE, AND POTASSIUM CHLORIDE SCH MLS/HR: 5; .9; .15 INJECTION INTRAVENOUS at 21:28

## 2020-01-18 RX ADMIN — DOCUSATE SODIUM LIQUID SCH MG: 100 LIQUID ORAL at 21:24

## 2020-01-18 RX ADMIN — Medication SCH MG: at 11:44

## 2020-01-18 RX ADMIN — LEVALBUTEROL SCH MG: 1.25 SOLUTION, CONCENTRATE RESPIRATORY (INHALATION) at 15:11

## 2020-01-18 RX ADMIN — DOCUSATE SODIUM LIQUID SCH MG: 100 LIQUID ORAL at 11:43

## 2020-01-18 RX ADMIN — LEVALBUTEROL SCH MG: 1.25 SOLUTION, CONCENTRATE RESPIRATORY (INHALATION) at 11:15

## 2020-01-18 RX ADMIN — LEVALBUTEROL SCH MG: 1.25 SOLUTION, CONCENTRATE RESPIRATORY (INHALATION) at 21:01

## 2020-01-18 RX ADMIN — ATORVASTATIN CALCIUM SCH MG: 20 TABLET, FILM COATED ORAL at 21:24

## 2020-01-18 RX ADMIN — LEVALBUTEROL SCH MG: 1.25 SOLUTION, CONCENTRATE RESPIRATORY (INHALATION) at 23:51

## 2020-01-18 RX ADMIN — DEXTROSE MONOHYDRATE SCH MLS/HR: 50 INJECTION, SOLUTION INTRAVENOUS at 23:48

## 2020-01-18 RX ADMIN — LEVALBUTEROL SCH MG: 1.25 SOLUTION, CONCENTRATE RESPIRATORY (INHALATION) at 07:00

## 2020-01-18 RX ADMIN — FAMOTIDINE SCH MG: 20 TABLET, FILM COATED ORAL at 11:43

## 2020-01-18 RX ADMIN — DEXTROSE MONOHYDRATE SCH MLS/HR: 50 INJECTION, SOLUTION INTRAVENOUS at 17:17

## 2020-01-18 RX ADMIN — Medication SCH EA: at 17:24

## 2020-01-18 RX ADMIN — DEXTROSE, SODIUM CHLORIDE, AND POTASSIUM CHLORIDE SCH MLS/HR: 5; .9; .15 INJECTION INTRAVENOUS at 10:46

## 2020-01-18 RX ADMIN — DEXTROSE, SODIUM CHLORIDE, AND POTASSIUM CHLORIDE SCH MLS/HR: 5; .9; .15 INJECTION INTRAVENOUS at 21:33

## 2020-01-18 RX ADMIN — Medication SCH EA: at 23:52

## 2020-01-18 RX ADMIN — RIVAROXABAN SCH MG: 20 TABLET, FILM COATED ORAL at 17:18

## 2020-01-18 RX ADMIN — PREDNISONE SCH MG: 10 TABLET ORAL at 11:44

## 2020-01-18 RX ADMIN — LEVOFLOXACIN SCH MLS/HR: 250 INJECTION, SOLUTION INTRAVENOUS at 11:52

## 2020-01-18 RX ADMIN — PREDNISONE SCH MG: 10 TABLET ORAL at 21:24

## 2020-01-18 RX ADMIN — Medication SCH EA: at 12:58

## 2020-01-18 RX ADMIN — DEXTROSE MONOHYDRATE SCH MLS/HR: 50 INJECTION, SOLUTION INTRAVENOUS at 11:31

## 2020-01-18 NOTE — NUR
Pt opened eyes for neurocheck.  Asked questions regarding A/O but pt closed eyes and went 
back to sleep. Will continue to monitor.

## 2020-01-18 NOTE — NUR
Received PT from ER. Transported by angela with ER RN and student nurse. Received with 
Levophed running at 5mcg/kg/min. SR on monitor. Pt sleeping and no signs of pain or in 
distress. Bed locked and in lowest position with call light in place. Will contact Brother 
for pt history.

## 2020-01-18 NOTE — NUR
-------------------------------------------------------------------------------

            *** Note rhiannon in EDM - 01/18/20 at 0924 by JACKI ***            

-------------------------------------------------------------------------------

Patient will be admitted to care of Dr. Shultz.  Admitted to ICU unit.  Will 
go to room 6.  Belongings list completed.  Complete and up to date summary 
report printed. SBAR report to be given at bedside with opportunity for 
questions.

## 2020-01-18 NOTE — NUR
B/P 128/57, P 82. Levophed drip decreased to 5 mcg/min. Pt resting quietly with 
even and non-labored respirations.

## 2020-01-18 NOTE — NUR
IV PLACEMENT:

# 22 gauge angiocath placed to right arm.  Use of asceptic technique.  Opsite placed over 
site.  Blood return noted. Flushed with 10 cc of normal saline.  No evidence of infiltration 
noted.  Patient tolerated well.

## 2020-01-18 NOTE — NUR
Pt BIB ALS, placed to ER bed 03, to gown, to cardiac monitor. Pt sent from [facility] r/t decreases O2 saturation and hypotension. Pt alert, responsive, 
non-verbal, arrives with O2 via SFM at 10 LPM, respirations labored, rhonchi 
and crackles to BBS. Pt , Dr. Herrmann made aware. 20 GA PIV LHA secure and 
patent, good blood return, easy NS flush. G-tube secure with dsg clean. F/C 
patent and secure with yellow urine to bag.

## 2020-01-18 NOTE — NUR
Nursing note



Examined PT from head to toe.  Skin intact with not signs of redness on nettie prominences, 
sacrum, heals, back or shoulder.  



Pt had BM, provided perianal care and changed bedding.  Pt tolerated well.

## 2020-01-18 NOTE — NUR
Pt resting quietly, even and non-labored respirations. Levophed continues at 5 
mcg/min to patent PIV LHA, no s/s infiltration.

## 2020-01-18 NOTE — NUR
Endorsement/Closing Notes



Provided shift report to night RN using SBAR.



Pt resting with no signs of pain or distress. Levophed running @ 6mcg/kg/min.  BP 91/63.  
Bed locked and in lowest position with call light in place.

## 2020-01-18 NOTE — NUR
-------------------------------------------------------------------------------

            *** Note rhiannon in EDM - 01/18/20 at 0522 by JACKI ***            

-------------------------------------------------------------------------------

B/P  86/47, R 103, SPO2 92%. Dr. Herrmann notified. Pt to receive NS Bolus 400 mL 
and begin Levophed drip.

## 2020-01-18 NOTE — NUR
CT HEAD



Transported to Radiology and returned with no incident. Pt HR SR RR 18, O2 Sat 99%, BP 
95/58.

## 2020-01-18 NOTE — NUR
FLU VACCINE



Called [facility] for flu vaccine status.  Stated she will call back with 
results.

## 2020-01-18 NOTE — NUR
Patient will be admitted to care of Dr. Denise.  Admitted to ICU unit.  Will go 
to room 6.  Belongings list completed.  Complete and up to date summary report 
printed. SBAR report to be given at bedside with opportunity for questions.

## 2020-01-18 NOTE — NUR
T 97.6, P 94, R 20, B/P 82/53, SPO2 97% on NRB at 15 LPM. Levophed started at 
10 mcg/min to patent and secure PIV LHA.

## 2020-01-18 NOTE — NUR
B/P  86/47, P 103, SPO2 92%. Dr. Herrmann notified. Pt to receive NS Bolus 400 mL 
and begin Levophed drip.

## 2020-01-18 NOTE — NUR
Per ABG, pO2 62. SPO2 88 - 90% on SFM at 6 LMP/NC. Dr. Herrmann aware. RT notified 
and at bedside. Awaiting orders per Dr. Herrmann.

## 2020-01-18 NOTE — NUR
Medication reconciliation completed with information provided by RHEA HAWKINS. 
Any prior medication reconciliation on file was reviewed and corrected.

## 2020-01-18 NOTE — NUR
Chief Complaint   Patient presents with     Procedure     polypectomy     Cliff Rubio LPN     RT at bedside. Pt placed on NRB mask at 15 LPM. SPO2 98%.

## 2020-01-18 NOTE — NUR
-------------------------------------------------------------------------------

            *** Note rhiannon in EDM - 01/18/20 at 0544 by JACKI ***            

-------------------------------------------------------------------------------

T 97.6, P 94, R 20, B/P 82/53, SPO2 97% on NRB at 6 LPM. Levophed started at 10 
mcg/min to patent and secure PIV LHA.

## 2020-01-19 VITALS — SYSTOLIC BLOOD PRESSURE: 115 MMHG

## 2020-01-19 VITALS — SYSTOLIC BLOOD PRESSURE: 97 MMHG

## 2020-01-19 VITALS — SYSTOLIC BLOOD PRESSURE: 100 MMHG

## 2020-01-19 VITALS — SYSTOLIC BLOOD PRESSURE: 120 MMHG

## 2020-01-19 VITALS — SYSTOLIC BLOOD PRESSURE: 117 MMHG

## 2020-01-19 VITALS — SYSTOLIC BLOOD PRESSURE: 104 MMHG

## 2020-01-19 VITALS — SYSTOLIC BLOOD PRESSURE: 129 MMHG

## 2020-01-19 VITALS — SYSTOLIC BLOOD PRESSURE: 101 MMHG

## 2020-01-19 VITALS — SYSTOLIC BLOOD PRESSURE: 138 MMHG

## 2020-01-19 VITALS — SYSTOLIC BLOOD PRESSURE: 114 MMHG

## 2020-01-19 VITALS — SYSTOLIC BLOOD PRESSURE: 123 MMHG

## 2020-01-19 VITALS — SYSTOLIC BLOOD PRESSURE: 98 MMHG

## 2020-01-19 VITALS — SYSTOLIC BLOOD PRESSURE: 113 MMHG

## 2020-01-19 VITALS — SYSTOLIC BLOOD PRESSURE: 105 MMHG

## 2020-01-19 LAB
ALBUMIN SERPL BCP-MCNC: 2.1 G/DL (ref 3.4–4.8)
ALT SERPL W P-5'-P-CCNC: 28 U/L (ref 12–78)
ANION GAP SERPL CALCULATED.3IONS-SCNC: 7 MMOL/L (ref 5–15)
AST SERPL W P-5'-P-CCNC: 18 U/L (ref 10–37)
BASOPHILS # BLD AUTO: 0 K/UL (ref 0–0.2)
BASOPHILS NFR BLD AUTO: 0.2 % (ref 0–2)
BILIRUB SERPL-MCNC: 0.4 MG/DL (ref 0–1)
BUN SERPL-MCNC: 18 MG/DL (ref 8–21)
CALCIUM SERPL-MCNC: 7.5 MG/DL (ref 8.4–11)
CHLORIDE SERPL-SCNC: 110 MMOL/L (ref 98–107)
CREAT SERPL-MCNC: 0.55 MG/DL (ref 0.55–1.3)
EOSINOPHIL # BLD AUTO: 0.1 K/UL (ref 0–0.4)
EOSINOPHIL NFR BLD AUTO: 0.6 % (ref 0–4)
ERYTHROCYTE [DISTWIDTH] IN BLOOD BY AUTOMATED COUNT: 14.7 % (ref 9–15)
GFR SERPL CREATININE-BSD FRML MDRD: (no result) ML/MIN (ref 90–?)
GLUCOSE SERPL-MCNC: 239 MG/DL (ref 70–99)
HCT VFR BLD AUTO: 34.1 % (ref 36–54)
HGB BLD-MCNC: 11.4 G/DL (ref 14–18)
LYMPHOCYTES # BLD AUTO: 0.8 K/UL (ref 1–5.5)
LYMPHOCYTES NFR BLD AUTO: 9.1 % (ref 20.5–51.5)
MCH RBC QN AUTO: 35 PG (ref 27–31)
MCHC RBC AUTO-ENTMCNC: 33 % (ref 32–36)
MCV RBC AUTO: 104 FL (ref 79–98)
MONOCYTES # BLD MANUAL: 0.5 K/UL (ref 0–1)
MONOCYTES # BLD MANUAL: 5.8 % (ref 1.7–9.3)
NEUTROPHILS # BLD AUTO: 7.7 K/UL (ref 1.8–7.7)
NEUTROPHILS NFR BLD AUTO: 84.3 % (ref 40–70)
PLATELET # BLD AUTO: 156 K/UL (ref 130–430)
POTASSIUM SERPL-SCNC: 4 MMOL/L (ref 3.5–5.1)
RBC # BLD AUTO: 3.28 MIL/UL (ref 4.2–6.2)
SODIUM SERPLBLD-SCNC: 141 MMOL/L (ref 136–145)
WBC # BLD AUTO: 9.1 K/UL (ref 4.8–10.8)

## 2020-01-19 RX ADMIN — Medication SCH EA: at 17:11

## 2020-01-19 RX ADMIN — HUMAN INSULIN PRN UNITS: 100 INJECTION, SOLUTION SUBCUTANEOUS at 00:02

## 2020-01-19 RX ADMIN — FAMOTIDINE SCH MG: 20 TABLET, FILM COATED ORAL at 09:28

## 2020-01-19 RX ADMIN — HUMAN INSULIN PRN UNITS: 100 INJECTION, SOLUTION SUBCUTANEOUS at 12:08

## 2020-01-19 RX ADMIN — DEXTROSE, SODIUM CHLORIDE, AND POTASSIUM CHLORIDE SCH MLS/HR: 5; .9; .15 INJECTION INTRAVENOUS at 02:55

## 2020-01-19 RX ADMIN — PREDNISONE SCH MG: 10 TABLET ORAL at 20:53

## 2020-01-19 RX ADMIN — Medication SCH EA: at 12:07

## 2020-01-19 RX ADMIN — LEVOFLOXACIN SCH MLS/HR: 250 INJECTION, SOLUTION INTRAVENOUS at 11:57

## 2020-01-19 RX ADMIN — RIVAROXABAN SCH MG: 20 TABLET, FILM COATED ORAL at 17:16

## 2020-01-19 RX ADMIN — DEXTROSE MONOHYDRATE SCH MLS/HR: 50 INJECTION, SOLUTION INTRAVENOUS at 05:36

## 2020-01-19 RX ADMIN — ATORVASTATIN CALCIUM SCH MG: 20 TABLET, FILM COATED ORAL at 20:54

## 2020-01-19 RX ADMIN — DOCUSATE SODIUM LIQUID SCH MG: 100 LIQUID ORAL at 09:27

## 2020-01-19 RX ADMIN — DEXTROSE MONOHYDRATE SCH MLS/HR: 50 INJECTION, SOLUTION INTRAVENOUS at 17:13

## 2020-01-19 RX ADMIN — DEXTROSE, SODIUM CHLORIDE, AND POTASSIUM CHLORIDE SCH MLS/HR: 5; .9; .15 INJECTION INTRAVENOUS at 20:19

## 2020-01-19 RX ADMIN — DEXTROSE, SODIUM CHLORIDE, AND POTASSIUM CHLORIDE SCH MLS/HR: 5; .9; .15 INJECTION INTRAVENOUS at 09:43

## 2020-01-19 RX ADMIN — HUMAN INSULIN PRN UNITS: 100 INJECTION, SOLUTION SUBCUTANEOUS at 05:54

## 2020-01-19 RX ADMIN — DOCUSATE SODIUM LIQUID SCH MG: 100 LIQUID ORAL at 20:53

## 2020-01-19 RX ADMIN — LEVALBUTEROL SCH MG: 1.25 SOLUTION, CONCENTRATE RESPIRATORY (INHALATION) at 23:00

## 2020-01-19 RX ADMIN — Medication SCH MG: at 09:00

## 2020-01-19 RX ADMIN — Medication SCH EA: at 05:33

## 2020-01-19 RX ADMIN — PREDNISONE SCH MG: 10 TABLET ORAL at 09:28

## 2020-01-19 RX ADMIN — LEVALBUTEROL SCH MG: 1.25 SOLUTION, CONCENTRATE RESPIRATORY (INHALATION) at 04:42

## 2020-01-19 RX ADMIN — DEXTROSE, SODIUM CHLORIDE, AND POTASSIUM CHLORIDE SCH MLS/HR: 5; .9; .15 INJECTION INTRAVENOUS at 17:14

## 2020-01-19 RX ADMIN — DEXTROSE MONOHYDRATE SCH MLS/HR: 50 INJECTION, SOLUTION INTRAVENOUS at 11:58

## 2020-01-19 RX ADMIN — LEVALBUTEROL SCH MG: 1.25 SOLUTION, CONCENTRATE RESPIRATORY (INHALATION) at 20:11

## 2020-01-19 RX ADMIN — HUMAN INSULIN PRN UNITS: 100 INJECTION, SOLUTION SUBCUTANEOUS at 17:17

## 2020-01-19 NOTE — NUR
AT 1730 REPORTED TO DR. CHUA REGARDING OF MRSA NARES POSITIVE, AS ORDERED DISCONTINUE 
LEVAQUIN, AND START VANCOMYCIN THERAPY, AS ORDERED VANCOMYCIN  PHARMACIST TO DOSE

## 2020-01-19 NOTE — NUR
[facility] FOLLOWED VERY SIMPLE COMMAND IS ABLE TO 
ANSWER WHEN ASKED,PATIENT IS IN SINUS RYTHM W/ FREQUENT PVC,RESPIRATION IS REGULAR HAS 
COURSE RHONCHI AND HAS CONGESTED COUGH BUT HE IS ANOT ABLE TO ECPECTORATE.PATIENT IS 
SATURATING 98% ON 4 LITERS NASAL CANNULA.PATIENT IS ON LEVOPHED AT 6 MCG/MIN INFUSING THRU 
HIS PERIPHERAL IV ON LEFT HAND BUT GETTING VERY POSITTIONAL AND WHEN IRRIGATED PATIENT 
CPMPLIN OF PAIN,SO LEVOPHED SWITHED AND HOOKED IN ON THE PERIPHERAL IV IN RIGHT FOREAR. HIS 
MAITEMCE IV IS INFUSING AT 150CC/HR,PATIENT ON CONTINOUS FEEDING WITH JEVITY GOING THRU HIS 
PEG AT 65CC/HR.PATIENT HAS ALSO SUPRAPUBIC CATHETER  URINE IS CLEAR BIPIN.

## 2020-01-19 NOTE — NUR
PATIENT WAS ACCEPTED AND ASSESS DONE AWAKE AND RESPOND WELL TO ALL COMMANDS , PATIENT HAS AN 
TUBE FEEDING PATIENT HE CAN EAT WITH HIS MONTH TALKING TO SELF AND ELLIOT PLAY  WITH HIS 
CLOTHING AND INSIDE HIS ORAL CAVITY, TRYING TO PULL SOMETHING OUT ABLE TO MOVED ALL 
EX.[place] CHES PAIN OR SOB, HAS ON NASAL CANNULA AT TWO LITE WILL NOT LEAVE ON WILL TAKE OFF 
,VITAL SIGNS STABLE , LEVOPHED DRIP WAS TURN OFF, WILL MONITOR THE BP ,NOTICE THE HEART RATE 
HAS INCREASED  130-150 WILL MONITOR CLOSELY 



2330 HEART RATE -190 , DR VELÁSQUEZ WAS CALLED , RETURNED WITH IN 20 MIN GAVE THE 
UPDATE ON THE PATIENT CONDITION  GAVE SOME ORDER  EKG,DECREASED IVF , AND CONSULT FOR 
JULES TRONCOSO TO MONITOR THE PATIENT  DR WHITE WAS CALLED AND SPOKE WITH THE NURSE UP DATE ON 
THE PATIENT CONDITION ,WANT TO HAVE THE EKG FAX TO HIM THIS WAS DONE WILL WANT FOR RETURN 
CALL, ASHVIN

## 2020-01-19 NOTE — NUR
CONSULT: DR. OVIEDO 



STAT CONSULT CALLED FOR DR. OVIEDO AT THIS TIME. DR. WHITE IS ON CALL. SPOKE WITH [facility] AT 
THE EXCHANGE. WILL AWAIT MD CALL BACK.

## 2020-01-20 VITALS — SYSTOLIC BLOOD PRESSURE: 140 MMHG

## 2020-01-20 VITALS — SYSTOLIC BLOOD PRESSURE: 142 MMHG

## 2020-01-20 VITALS — SYSTOLIC BLOOD PRESSURE: 158 MMHG

## 2020-01-20 VITALS — SYSTOLIC BLOOD PRESSURE: 106 MMHG

## 2020-01-20 VITALS — SYSTOLIC BLOOD PRESSURE: 109 MMHG

## 2020-01-20 VITALS — SYSTOLIC BLOOD PRESSURE: 135 MMHG

## 2020-01-20 VITALS — SYSTOLIC BLOOD PRESSURE: 131 MMHG

## 2020-01-20 VITALS — SYSTOLIC BLOOD PRESSURE: 128 MMHG

## 2020-01-20 VITALS — SYSTOLIC BLOOD PRESSURE: 115 MMHG

## 2020-01-20 VITALS — SYSTOLIC BLOOD PRESSURE: 113 MMHG

## 2020-01-20 VITALS — SYSTOLIC BLOOD PRESSURE: 114 MMHG

## 2020-01-20 VITALS — SYSTOLIC BLOOD PRESSURE: 151 MMHG

## 2020-01-20 VITALS — SYSTOLIC BLOOD PRESSURE: 129 MMHG

## 2020-01-20 VITALS — SYSTOLIC BLOOD PRESSURE: 112 MMHG

## 2020-01-20 VITALS — SYSTOLIC BLOOD PRESSURE: 101 MMHG

## 2020-01-20 VITALS — SYSTOLIC BLOOD PRESSURE: 155 MMHG

## 2020-01-20 VITALS — SYSTOLIC BLOOD PRESSURE: 143 MMHG

## 2020-01-20 VITALS — SYSTOLIC BLOOD PRESSURE: 105 MMHG

## 2020-01-20 VITALS — SYSTOLIC BLOOD PRESSURE: 93 MMHG

## 2020-01-20 VITALS — SYSTOLIC BLOOD PRESSURE: 111 MMHG

## 2020-01-20 VITALS — SYSTOLIC BLOOD PRESSURE: 141 MMHG

## 2020-01-20 VITALS — SYSTOLIC BLOOD PRESSURE: 98 MMHG

## 2020-01-20 LAB
ALBUMIN SERPL BCP-MCNC: 2.2 G/DL (ref 3.4–4.8)
ALT SERPL W P-5'-P-CCNC: 30 U/L (ref 12–78)
ANION GAP SERPL CALCULATED.3IONS-SCNC: 6 MMOL/L (ref 5–15)
AST SERPL W P-5'-P-CCNC: 20 U/L (ref 10–37)
BASOPHILS # BLD AUTO: 0 K/UL (ref 0–0.2)
BASOPHILS NFR BLD AUTO: 0.4 % (ref 0–2)
BILIRUB SERPL-MCNC: 0.3 MG/DL (ref 0–1)
BUN SERPL-MCNC: 8 MG/DL (ref 8–21)
CALCIUM SERPL-MCNC: 7.8 MG/DL (ref 8.4–11)
CHLORIDE SERPL-SCNC: 108 MMOL/L (ref 98–107)
CREAT SERPL-MCNC: 0.47 MG/DL (ref 0.55–1.3)
EOSINOPHIL # BLD AUTO: 0.1 K/UL (ref 0–0.4)
EOSINOPHIL NFR BLD AUTO: 1.5 % (ref 0–4)
ERYTHROCYTE [DISTWIDTH] IN BLOOD BY AUTOMATED COUNT: 14.5 % (ref 9–15)
GFR SERPL CREATININE-BSD FRML MDRD: (no result) ML/MIN (ref 90–?)
GLUCOSE SERPL-MCNC: 91 MG/DL (ref 70–99)
HCT VFR BLD AUTO: 34.4 % (ref 36–54)
HGB BLD-MCNC: 11.4 G/DL (ref 14–18)
LYMPHOCYTES # BLD AUTO: 0.8 K/UL (ref 1–5.5)
LYMPHOCYTES NFR BLD AUTO: 10 % (ref 20.5–51.5)
MCH RBC QN AUTO: 35 PG (ref 27–31)
MCHC RBC AUTO-ENTMCNC: 33 % (ref 32–36)
MCV RBC AUTO: 104 FL (ref 79–98)
MONOCYTES # BLD MANUAL: 0.5 K/UL (ref 0–1)
MONOCYTES # BLD MANUAL: 6.9 % (ref 1.7–9.3)
NEUTROPHILS # BLD AUTO: 6.3 K/UL (ref 1.8–7.7)
NEUTROPHILS NFR BLD AUTO: 81.2 % (ref 40–70)
PLATELET # BLD AUTO: 128 K/UL (ref 130–430)
POTASSIUM SERPL-SCNC: 4.2 MMOL/L (ref 3.5–5.1)
RBC # BLD AUTO: 3.3 MIL/UL (ref 4.2–6.2)
SODIUM SERPLBLD-SCNC: 140 MMOL/L (ref 136–145)
WBC # BLD AUTO: 7.7 K/UL (ref 4.8–10.8)

## 2020-01-20 RX ADMIN — LEVALBUTEROL SCH MG: 1.25 SOLUTION, CONCENTRATE RESPIRATORY (INHALATION) at 19:47

## 2020-01-20 RX ADMIN — DEXTROSE MONOHYDRATE SCH MLS/HR: 50 INJECTION, SOLUTION INTRAVENOUS at 11:04

## 2020-01-20 RX ADMIN — Medication SCH EA: at 17:41

## 2020-01-20 RX ADMIN — DOCUSATE SODIUM LIQUID SCH MG: 100 LIQUID ORAL at 08:44

## 2020-01-20 RX ADMIN — HUMAN INSULIN PRN UNITS: 100 INJECTION, SOLUTION SUBCUTANEOUS at 01:10

## 2020-01-20 RX ADMIN — Medication SCH EA: at 08:00

## 2020-01-20 RX ADMIN — DOCUSATE SODIUM LIQUID SCH MG: 100 LIQUID ORAL at 21:34

## 2020-01-20 RX ADMIN — Medication SCH EA: at 01:09

## 2020-01-20 RX ADMIN — DEXTROSE MONOHYDRATE SCH MLS/HR: 50 INJECTION, SOLUTION INTRAVENOUS at 01:09

## 2020-01-20 RX ADMIN — DEXTROSE, SODIUM CHLORIDE, AND POTASSIUM CHLORIDE SCH MLS/HR: 5; .9; .15 INJECTION INTRAVENOUS at 08:05

## 2020-01-20 RX ADMIN — DEXTROSE SCH MLS/HR: 50 INJECTION, SOLUTION INTRAVENOUS at 21:39

## 2020-01-20 RX ADMIN — LEVALBUTEROL SCH MG: 1.25 SOLUTION, CONCENTRATE RESPIRATORY (INHALATION) at 23:20

## 2020-01-20 RX ADMIN — LEVALBUTEROL SCH MG: 1.25 SOLUTION, CONCENTRATE RESPIRATORY (INHALATION) at 15:32

## 2020-01-20 RX ADMIN — RIVAROXABAN SCH MG: 20 TABLET, FILM COATED ORAL at 17:36

## 2020-01-20 RX ADMIN — PREDNISONE SCH MG: 10 TABLET ORAL at 08:43

## 2020-01-20 RX ADMIN — FAMOTIDINE SCH MG: 20 TABLET, FILM COATED ORAL at 08:43

## 2020-01-20 RX ADMIN — SODIUM CHLORIDE SCH MLS/HR: 0.9 INJECTION, SOLUTION INTRAVENOUS at 11:03

## 2020-01-20 RX ADMIN — HUMAN INSULIN PRN UNITS: 100 INJECTION, SOLUTION SUBCUTANEOUS at 17:41

## 2020-01-20 RX ADMIN — SODIUM CHLORIDE SCH MLS/HR: 450 INJECTION, SOLUTION INTRAVENOUS at 11:04

## 2020-01-20 RX ADMIN — PREDNISONE SCH MG: 10 TABLET ORAL at 21:34

## 2020-01-20 RX ADMIN — ATORVASTATIN CALCIUM SCH MG: 20 TABLET, FILM COATED ORAL at 21:34

## 2020-01-20 RX ADMIN — LEVALBUTEROL SCH MG: 1.25 SOLUTION, CONCENTRATE RESPIRATORY (INHALATION) at 11:37

## 2020-01-20 RX ADMIN — HUMAN INSULIN PRN UNITS: 100 INJECTION, SOLUTION SUBCUTANEOUS at 11:17

## 2020-01-20 RX ADMIN — LEVALBUTEROL SCH MG: 1.25 SOLUTION, CONCENTRATE RESPIRATORY (INHALATION) at 03:00

## 2020-01-20 RX ADMIN — Medication SCH MG: at 21:37

## 2020-01-20 RX ADMIN — DEXTROSE MONOHYDRATE SCH MLS/HR: 50 INJECTION, SOLUTION INTRAVENOUS at 07:25

## 2020-01-20 RX ADMIN — Medication SCH EA: at 11:17

## 2020-01-20 RX ADMIN — Medication SCH MG: at 08:44

## 2020-01-20 RX ADMIN — LEVALBUTEROL SCH MG: 1.25 SOLUTION, CONCENTRATE RESPIRATORY (INHALATION) at 07:27

## 2020-01-20 NOTE — NUR
SEE PATIENT, WAS AWARE PATIENT HAD MORE PVCs, AS ORDERED MONITOR 24 HOURS, IS 
CONDITION STABLE ,THEN O.K DOWN GRADE TO TELE STATUS IN TOMORROW MORNING

## 2020-01-20 NOTE — NUR
AT 0935 A.M,  SEE PATIENT, AS ORDERED FOLLOW UP CBC, CMP, ABG, CHEST X-RAY IN 
TOMORROW A.M

-------------------------------------------------------------------------------

Addendum: 01/20/20 at 1051 by Ludin Tucker RN

-------------------------------------------------------------------------------

WRONG PATIENT

## 2020-01-20 NOTE — NUR
Dietitian Recommendations 

*Recommend Vital AF 1.2 at 70ml/hr (goal rate), FWF 110ml Q6H via GT

Provides: 2016 kcal, 126 gm protein and 1802ml free water daily.

Meets: 95% of lower end of est calorie needs and 89% of upper end of est 

protein needs. 



Please see Nutritional Assessment for details.

JORGE, RD

## 2020-01-20 NOTE — NUR
AT 1005 A.M,  SEE PATIENT, AS ORDERED CHANGE IVF AND FOLLOW UP CBC. CMP, CHEST X-RAY 
IN TOMORROW A.M

## 2020-01-20 NOTE — NUR
noted episodes of hypotension SBP 87 while sleeping. easy to arouse and BP corrected. will 
continue to monitor patient for needs of vasopressors.

## 2020-01-20 NOTE — NUR
PATIENT IS MORE CONFUSED THAN BEFORE PULLING AT IV SITE AND REMOVING HIS GOWN , WHEN ASK TO 
STOP WILL NATO  OF DOING ANYTHING GET AGITATED AND COMBATIVE, STRIKING OUT AT PERSON TAKEN 
CARE OF HIM, PATIENT WILL NEED AN SITTER  



0600 PATIENT HAD PULL OUT THE SALINE LOCK LEFT HAND AND DENIES , THE IV SITE ON THE RIGHT 
HAND WAS WRAP WITH ROSSI TO PREVENT BEING TOUCH WILL NEED TO CONTINUED WITH PLAN OF CARE, 
OFF THE LEVOPHED DRIP SINCE 1930 AM VITAL SIGNS STABLE MAY TRANSF TO THE FLOOR WITH SITTER, 
STABLE

## 2020-01-20 NOTE — NUR
PATIENT IS ALERT, AWAKE, VERBALIZED RESPONSE, CONFUSION ,EPISODES OF VERBAL ABUSE AND 
AGGRESSIVE TO STAFFS, PULLED OUT LEFT HAND IV SITE AND PULLED OUT O2 NASAL CANNULA , GIVE 
EXPLAINED AND REORIENTATION AS NEED, H.R IS SINUS ARRHYTHMIA WITH PVCs, AT 0905 A.M, 
 SEE PATIENT

## 2020-01-21 VITALS — SYSTOLIC BLOOD PRESSURE: 98 MMHG

## 2020-01-21 VITALS — SYSTOLIC BLOOD PRESSURE: 124 MMHG

## 2020-01-21 VITALS — SYSTOLIC BLOOD PRESSURE: 113 MMHG

## 2020-01-21 VITALS — SYSTOLIC BLOOD PRESSURE: 93 MMHG

## 2020-01-21 VITALS — SYSTOLIC BLOOD PRESSURE: 131 MMHG

## 2020-01-21 VITALS — SYSTOLIC BLOOD PRESSURE: 91 MMHG

## 2020-01-21 VITALS — SYSTOLIC BLOOD PRESSURE: 119 MMHG

## 2020-01-21 VITALS — SYSTOLIC BLOOD PRESSURE: 95 MMHG

## 2020-01-21 VITALS — SYSTOLIC BLOOD PRESSURE: 105 MMHG

## 2020-01-21 VITALS — SYSTOLIC BLOOD PRESSURE: 117 MMHG

## 2020-01-21 VITALS — SYSTOLIC BLOOD PRESSURE: 133 MMHG

## 2020-01-21 VITALS — SYSTOLIC BLOOD PRESSURE: 115 MMHG

## 2020-01-21 VITALS — SYSTOLIC BLOOD PRESSURE: 108 MMHG

## 2020-01-21 VITALS — SYSTOLIC BLOOD PRESSURE: 121 MMHG

## 2020-01-21 VITALS — SYSTOLIC BLOOD PRESSURE: 109 MMHG

## 2020-01-21 VITALS — SYSTOLIC BLOOD PRESSURE: 75 MMHG

## 2020-01-21 VITALS — SYSTOLIC BLOOD PRESSURE: 114 MMHG

## 2020-01-21 VITALS — SYSTOLIC BLOOD PRESSURE: 118 MMHG

## 2020-01-21 LAB
ALBUMIN SERPL BCP-MCNC: 2.2 G/DL (ref 3.4–4.8)
ALT SERPL W P-5'-P-CCNC: 32 U/L (ref 12–78)
ANION GAP SERPL CALCULATED.3IONS-SCNC: 4 MMOL/L (ref 5–15)
AST SERPL W P-5'-P-CCNC: 29 U/L (ref 10–37)
BASOPHILS # BLD AUTO: 0 K/UL (ref 0–0.2)
BASOPHILS NFR BLD AUTO: 0.5 % (ref 0–2)
BILIRUB SERPL-MCNC: 0.5 MG/DL (ref 0–1)
BUN SERPL-MCNC: 14 MG/DL (ref 8–21)
CALCIUM SERPL-MCNC: 8.3 MG/DL (ref 8.4–11)
CHLORIDE SERPL-SCNC: 100 MMOL/L (ref 98–107)
CREAT SERPL-MCNC: 0.44 MG/DL (ref 0.55–1.3)
EOSINOPHIL # BLD AUTO: 0.4 K/UL (ref 0–0.4)
EOSINOPHIL NFR BLD AUTO: 4.6 % (ref 0–4)
ERYTHROCYTE [DISTWIDTH] IN BLOOD BY AUTOMATED COUNT: 14.4 % (ref 9–15)
GFR SERPL CREATININE-BSD FRML MDRD: (no result) ML/MIN (ref 90–?)
GLUCOSE SERPL-MCNC: 85 MG/DL (ref 70–99)
HCT VFR BLD AUTO: 35.8 % (ref 36–54)
HGB BLD-MCNC: 12 G/DL (ref 14–18)
LYMPHOCYTES # BLD AUTO: 1.5 K/UL (ref 1–5.5)
LYMPHOCYTES NFR BLD AUTO: 18.8 % (ref 20.5–51.5)
MCH RBC QN AUTO: 34 PG (ref 27–31)
MCHC RBC AUTO-ENTMCNC: 34 % (ref 32–36)
MCV RBC AUTO: 103 FL (ref 79–98)
MONOCYTES # BLD MANUAL: 0.8 K/UL (ref 0–1)
MONOCYTES # BLD MANUAL: 10.2 % (ref 1.7–9.3)
NEUTROPHILS # BLD AUTO: 5.4 K/UL (ref 1.8–7.7)
NEUTROPHILS NFR BLD AUTO: 65.9 % (ref 40–70)
PLATELET # BLD AUTO: 135 K/UL (ref 130–430)
POTASSIUM SERPL-SCNC: 3.8 MMOL/L (ref 3.5–5.1)
RBC # BLD AUTO: 3.49 MIL/UL (ref 4.2–6.2)
SODIUM SERPLBLD-SCNC: 132 MMOL/L (ref 136–145)
WBC # BLD AUTO: 8.2 K/UL (ref 4.8–10.8)

## 2020-01-21 RX ADMIN — Medication SCH MG: at 20:13

## 2020-01-21 RX ADMIN — DOCUSATE SODIUM LIQUID SCH MG: 100 LIQUID ORAL at 20:12

## 2020-01-21 RX ADMIN — SODIUM CHLORIDE SCH MLS/HR: 0.9 INJECTION, SOLUTION INTRAVENOUS at 09:44

## 2020-01-21 RX ADMIN — Medication SCH EA: at 06:51

## 2020-01-21 RX ADMIN — LEVALBUTEROL SCH MG: 1.25 SOLUTION, CONCENTRATE RESPIRATORY (INHALATION) at 11:28

## 2020-01-21 RX ADMIN — NOREPINEPHRINE BITARTRATE PRN MLS/HR: 1 INJECTION, SOLUTION, CONCENTRATE INTRAVENOUS at 13:40

## 2020-01-21 RX ADMIN — PREDNISONE SCH MG: 10 TABLET ORAL at 08:09

## 2020-01-21 RX ADMIN — SODIUM CHLORIDE SCH MLS/HR: 0.9 INJECTION, SOLUTION INTRAVENOUS at 01:28

## 2020-01-21 RX ADMIN — ACETAMINOPHEN PRN MG: 650 SOLUTION ORAL at 11:23

## 2020-01-21 RX ADMIN — FAMOTIDINE SCH MG: 20 TABLET, FILM COATED ORAL at 08:09

## 2020-01-21 RX ADMIN — LEVALBUTEROL SCH MG: 1.25 SOLUTION, CONCENTRATE RESPIRATORY (INHALATION) at 23:24

## 2020-01-21 RX ADMIN — RIVAROXABAN SCH MG: 20 TABLET, FILM COATED ORAL at 17:07

## 2020-01-21 RX ADMIN — ATORVASTATIN CALCIUM SCH MG: 20 TABLET, FILM COATED ORAL at 20:13

## 2020-01-21 RX ADMIN — HUMAN INSULIN PRN UNITS: 100 INJECTION, SOLUTION SUBCUTANEOUS at 17:20

## 2020-01-21 RX ADMIN — SODIUM CHLORIDE SCH MLS/HR: 450 INJECTION, SOLUTION INTRAVENOUS at 09:45

## 2020-01-21 RX ADMIN — DOCUSATE SODIUM LIQUID SCH MG: 100 LIQUID ORAL at 08:08

## 2020-01-21 RX ADMIN — LEVALBUTEROL SCH MG: 1.25 SOLUTION, CONCENTRATE RESPIRATORY (INHALATION) at 07:48

## 2020-01-21 RX ADMIN — Medication SCH EA: at 17:20

## 2020-01-21 RX ADMIN — DEXTROSE SCH MLS/HR: 50 INJECTION, SOLUTION INTRAVENOUS at 08:08

## 2020-01-21 RX ADMIN — Medication SCH MG: at 08:10

## 2020-01-21 RX ADMIN — SODIUM CHLORIDE SCH MLS/HR: 0.9 INJECTION, SOLUTION INTRAVENOUS at 22:22

## 2020-01-21 RX ADMIN — PREDNISONE SCH MG: 10 TABLET ORAL at 20:13

## 2020-01-21 RX ADMIN — DEXTROSE SCH MLS/HR: 50 INJECTION, SOLUTION INTRAVENOUS at 20:12

## 2020-01-21 RX ADMIN — Medication SCH EA: at 01:27

## 2020-01-21 RX ADMIN — Medication SCH EA: at 11:24

## 2020-01-21 RX ADMIN — LEVALBUTEROL SCH MG: 1.25 SOLUTION, CONCENTRATE RESPIRATORY (INHALATION) at 03:00

## 2020-01-21 RX ADMIN — LEVALBUTEROL SCH MG: 1.25 SOLUTION, CONCENTRATE RESPIRATORY (INHALATION) at 20:09

## 2020-01-21 RX ADMIN — LEVALBUTEROL SCH MG: 1.25 SOLUTION, CONCENTRATE RESPIRATORY (INHALATION) at 15:36

## 2020-01-21 NOTE — NUR
AFTER IV BOLUS, PATIENT,S B.P IS 86/41 MMHG, H.R 96 IS SINUS RHYTHM WITH PVCs, AS  
ORDERED ON LEVOPHED DRIP START 4 MCG/MIN, KEEP SBP> 90 MMHG, AND TRANSFER TO ICU STATUS

## 2020-01-21 NOTE — NUR
AT 1310 P.M PATIENT IS SHOW  HYPOTENSION, B.P 78/36 MMHG-83/46 MMHG, REPORTED TO DR. PANG, 
AS ORDERED GIVE N.S IV BOLUS 250 ML X ONCE,

## 2020-01-21 NOTE — NUR
VICKIEW conducted a Discharge Plan/ICU Assessment

Patient has severe dementia and is unable to make his own decisions.

Phoned Baldev at [facility], [phone]. Patient has been there several years and is on a 
bed hold. They plan to accept patient back upon discharge. 

Phoned brother, Dale Griffiths, [phone]. He agrees with patient returning to [facility] upon discharge. He prefers patient's niece, Samantha Herrmann, [phone], be the primary 
contact/decision maker. He had no questions but would like to be notified when patient is 
transferred back to the SNF. /Case Management/Discharge Coordinator will 
remain available.

## 2020-01-21 NOTE — NUR
AT 0900 A.M,  SEE PATIENT, VERBALIZED IS O.K DOWN GRADE TO TELE STATUS, WAS NOTIFY 
CHARGE NURSE ANA AWARE, AS ORDERED FOLLOW UP 2D ECHO CARDIOGRAM TODAY AND CHEST X-RAY, CBC, 
BNP, BMP IN TOMORROW A.M

## 2020-01-21 NOTE — NUR
SEE PATIENT, R.N REPORTED TO HIM REGARDING OF LAB: SODIUM LEVEL 132, DR. VELÁSQUEZ 
VERBALIZED: IS O.K

## 2020-01-22 VITALS — SYSTOLIC BLOOD PRESSURE: 89 MMHG

## 2020-01-22 VITALS — SYSTOLIC BLOOD PRESSURE: 115 MMHG

## 2020-01-22 VITALS — SYSTOLIC BLOOD PRESSURE: 109 MMHG

## 2020-01-22 VITALS — SYSTOLIC BLOOD PRESSURE: 107 MMHG

## 2020-01-22 VITALS — SYSTOLIC BLOOD PRESSURE: 102 MMHG

## 2020-01-22 VITALS — SYSTOLIC BLOOD PRESSURE: 123 MMHG

## 2020-01-22 VITALS — SYSTOLIC BLOOD PRESSURE: 113 MMHG

## 2020-01-22 VITALS — SYSTOLIC BLOOD PRESSURE: 119 MMHG

## 2020-01-22 VITALS — SYSTOLIC BLOOD PRESSURE: 110 MMHG

## 2020-01-22 VITALS — SYSTOLIC BLOOD PRESSURE: 98 MMHG

## 2020-01-22 VITALS — SYSTOLIC BLOOD PRESSURE: 118 MMHG

## 2020-01-22 VITALS — SYSTOLIC BLOOD PRESSURE: 103 MMHG

## 2020-01-22 VITALS — SYSTOLIC BLOOD PRESSURE: 137 MMHG

## 2020-01-22 VITALS — SYSTOLIC BLOOD PRESSURE: 132 MMHG

## 2020-01-22 VITALS — SYSTOLIC BLOOD PRESSURE: 101 MMHG

## 2020-01-22 VITALS — SYSTOLIC BLOOD PRESSURE: 120 MMHG

## 2020-01-22 VITALS — SYSTOLIC BLOOD PRESSURE: 87 MMHG

## 2020-01-22 VITALS — SYSTOLIC BLOOD PRESSURE: 122 MMHG

## 2020-01-22 LAB
ALBUMIN SERPL BCP-MCNC: 2 G/DL (ref 3.4–4.8)
ALT SERPL W P-5'-P-CCNC: 25 U/L (ref 12–78)
ANION GAP SERPL CALCULATED.3IONS-SCNC: 6 MMOL/L (ref 5–15)
AST SERPL W P-5'-P-CCNC: 19 U/L (ref 10–37)
BASOPHILS # BLD AUTO: 0 K/UL (ref 0–0.2)
BASOPHILS NFR BLD AUTO: 0.3 % (ref 0–2)
BILIRUB SERPL-MCNC: 0.4 MG/DL (ref 0–1)
BUN SERPL-MCNC: 16 MG/DL (ref 8–21)
CALCIUM SERPL-MCNC: 7.9 MG/DL (ref 8.4–11)
CHLORIDE SERPL-SCNC: 102 MMOL/L (ref 98–107)
CREAT SERPL-MCNC: 0.54 MG/DL (ref 0.55–1.3)
EOSINOPHIL # BLD AUTO: 0 K/UL (ref 0–0.4)
EOSINOPHIL NFR BLD AUTO: 0.4 % (ref 0–4)
ERYTHROCYTE [DISTWIDTH] IN BLOOD BY AUTOMATED COUNT: 14.8 % (ref 9–15)
GFR SERPL CREATININE-BSD FRML MDRD: (no result) ML/MIN (ref 90–?)
GLUCOSE SERPL-MCNC: 161 MG/DL (ref 70–99)
HCT VFR BLD AUTO: 34.4 % (ref 36–54)
HGB BLD-MCNC: 11.4 G/DL (ref 14–18)
LYMPHOCYTES # BLD AUTO: 0.8 K/UL (ref 1–5.5)
LYMPHOCYTES NFR BLD AUTO: 7.9 % (ref 20.5–51.5)
MCH RBC QN AUTO: 34 PG (ref 27–31)
MCHC RBC AUTO-ENTMCNC: 33 % (ref 32–36)
MCV RBC AUTO: 103 FL (ref 79–98)
MONOCYTES # BLD MANUAL: 1.1 K/UL (ref 0–1)
MONOCYTES # BLD MANUAL: 11.8 % (ref 1.7–9.3)
NEUTROPHILS # BLD AUTO: 7.6 K/UL (ref 1.8–7.7)
NEUTROPHILS NFR BLD AUTO: 79.6 % (ref 40–70)
PLATELET # BLD AUTO: 129 K/UL (ref 130–430)
POTASSIUM SERPL-SCNC: 3.5 MMOL/L (ref 3.5–5.1)
RBC # BLD AUTO: 3.35 MIL/UL (ref 4.2–6.2)
SODIUM SERPLBLD-SCNC: 134 MMOL/L (ref 136–145)
VANCOMYCIN TROUGH SERPL-MCNC: 13.1 UG/ML (ref 5–10)
WBC # BLD AUTO: 9.6 K/UL (ref 4.8–10.8)

## 2020-01-22 RX ADMIN — NOREPINEPHRINE BITARTRATE PRN MLS/HR: 1 INJECTION, SOLUTION, CONCENTRATE INTRAVENOUS at 06:55

## 2020-01-22 RX ADMIN — Medication SCH MG: at 21:00

## 2020-01-22 RX ADMIN — LEVALBUTEROL SCH MG: 1.25 SOLUTION, CONCENTRATE RESPIRATORY (INHALATION) at 19:35

## 2020-01-22 RX ADMIN — Medication SCH MG: at 08:10

## 2020-01-22 RX ADMIN — LEVALBUTEROL SCH MG: 1.25 SOLUTION, CONCENTRATE RESPIRATORY (INHALATION) at 08:15

## 2020-01-22 RX ADMIN — SODIUM CHLORIDE SCH MLS/HR: 9 INJECTION, SOLUTION INTRAVENOUS at 09:29

## 2020-01-22 RX ADMIN — SODIUM CHLORIDE SCH MLS/HR: 0.9 INJECTION, SOLUTION INTRAVENOUS at 11:00

## 2020-01-22 RX ADMIN — LEVALBUTEROL SCH MG: 1.25 SOLUTION, CONCENTRATE RESPIRATORY (INHALATION) at 15:19

## 2020-01-22 RX ADMIN — LEVALBUTEROL SCH MG: 1.25 SOLUTION, CONCENTRATE RESPIRATORY (INHALATION) at 03:00

## 2020-01-22 RX ADMIN — DOCUSATE SODIUM LIQUID SCH MG: 100 LIQUID ORAL at 20:58

## 2020-01-22 RX ADMIN — SODIUM CHLORIDE SCH MLS/HR: 450 INJECTION, SOLUTION INTRAVENOUS at 08:11

## 2020-01-22 RX ADMIN — Medication SCH EA: at 17:30

## 2020-01-22 RX ADMIN — RIVAROXABAN SCH MG: 20 TABLET, FILM COATED ORAL at 17:31

## 2020-01-22 RX ADMIN — DOCUSATE SODIUM LIQUID SCH MG: 100 LIQUID ORAL at 08:09

## 2020-01-22 RX ADMIN — LEVALBUTEROL SCH MG: 1.25 SOLUTION, CONCENTRATE RESPIRATORY (INHALATION) at 23:10

## 2020-01-22 RX ADMIN — DEXTROSE SCH MLS/HR: 50 INJECTION, SOLUTION INTRAVENOUS at 20:56

## 2020-01-22 RX ADMIN — LEVALBUTEROL SCH MG: 1.25 SOLUTION, CONCENTRATE RESPIRATORY (INHALATION) at 11:21

## 2020-01-22 RX ADMIN — FAMOTIDINE SCH MG: 20 TABLET, FILM COATED ORAL at 08:09

## 2020-01-22 RX ADMIN — Medication SCH EA: at 06:05

## 2020-01-22 RX ADMIN — PREDNISONE SCH MG: 10 TABLET ORAL at 20:58

## 2020-01-22 RX ADMIN — ACETAMINOPHEN PRN MG: 650 SOLUTION ORAL at 00:35

## 2020-01-22 RX ADMIN — NOREPINEPHRINE BITARTRATE PRN MLS/HR: 1 INJECTION, SOLUTION, CONCENTRATE INTRAVENOUS at 18:28

## 2020-01-22 RX ADMIN — Medication SCH EA: at 12:31

## 2020-01-22 RX ADMIN — ATORVASTATIN CALCIUM SCH MG: 20 TABLET, FILM COATED ORAL at 20:58

## 2020-01-22 RX ADMIN — PREDNISONE SCH MG: 10 TABLET ORAL at 08:10

## 2020-01-22 RX ADMIN — DEXTROSE SCH MLS/HR: 50 INJECTION, SOLUTION INTRAVENOUS at 08:09

## 2020-01-22 RX ADMIN — Medication SCH EA: at 23:24

## 2020-01-22 RX ADMIN — Medication SCH EA: at 00:34

## 2020-01-22 RX ADMIN — SODIUM CHLORIDE SCH MLS/HR: 0.9 INJECTION, SOLUTION INTRAVENOUS at 17:32

## 2020-01-22 NOTE — NUR
Nutrition F/U



RD reviewed pt's current EMR record including diet Hx, physician notes, nursing notes, 
pertinent labs/meds/procedures, care trends, and care activity.



Admission Dx: Pneumonia, septic shock

Pt also found w/ aspiration pneumonia, septic shock, acute respiratory failure, DM, Hx of 
DVT of LE, mild anemia per physician notes





PMH: dementia, HTN, paroxysmal atr fibr, BPH, GT placement per physician notes



Current Diet Order/Nutrition Support: Vital AF 1.2 at 70 ml/hr, Free Water Flush: 110 ml Q6H 
via GT x2 days



Subjective Info: Pt seen sleeping in bed, +nasal cannula, w/ TF infusing as per physician 
order. Per RN, pt has been tolerating TF well, no residuals. RD recommended to modify TF 
formula d/t lack of supply of Vital AF 1.2; replacement formula: Jevity 1.5. RN 
acknowledged. BM noted last night during night shift per RN report. No pending 
plans/procedures per RN report.



Current % PO 

N/A

Estimated Energy Expenditure (kcals/day) 

0244-3102 kcal/day (30-35 kcal/kg CBW for sepsis)

Estimated Protein Required (g/day) 

106-142 gm/day (1.5-2 gm/kg CBW for sepsis)

Estimated Fluid Required (l/day) 

1.8-2.1 L/day (25-30 ml/kg CBW for maintenance)

Problem/Etiology/Signs/Symptoms 

Increased nutrient needs r/t metabolic demands AEB estimated nutrient needs for sepsis. 

*ongoing

Expected Outcomes/Goals 

Monitor EN tolerance and intake w/ goal of pt meeting at least 75% of 

estimated nutritional needs, labs trending WNL, normal GI function, skin 

integrity/wt maintenance.

Dietitian Recommendations 

* Recommend Jevity 1.5 at 65 ml/hr, Free Water Flush: 175 ml Q6h via GT

Provides: 2340 kcal/day, 100 gm protein/day, and 1886 ml free water/day

Meets: 94% of upper end of estimated caloric needs and 94% of lower end of estimated 
protein needs 

Follow Up 

High Risk: F/U in 2-3 days

## 2020-01-22 NOTE — NUR
LEVOPHED DRIP TITRATED TO 4 MCG/MIN.

B/P=107/65, P=91, O2 Sat=96%. Patient BP stable, Levophed drip titrated from 6mcg/min to 
4mcg/min. Will continue to monitor.

## 2020-01-22 NOTE — NUR
CLOSING NOTE

Patient resting in the bed with eyes closed. No acute distress. On O2 1L/min via NC. HOB 
elevated. On GT feeding of Vital AF at 70ml/hr, tolerated well. Skin warm and dry to touch. 
IV intact to RFA, no redness, no swelling, no drainage. On Levophed at 4mcg/min and NS at 
100ml/hr, infusing well. F/C intact, drain gravity. On contact isolation. Safety measure 
maintained. Call light within reached. Report and SBAR given to SCOTTY Leach at bedside.

## 2020-01-22 NOTE — NUR
RECEIVED REPORT FROM IVY PATIENT IS AWKE HE RESPONDED WHEN ASKED HOW HE WAS DOING PATIENT IS 
ON 1 LITER NASAL CANNULA SATURATING 96%,REPIRATION IS AT 36/MIN,BREATHE SOUND WITH COURSE 
RHONCHI THROUGHOU AND HAS CONGESTED COUGH PATIENT IS SWALLOWING HIS PHLEGM PATIENT 
PERIPHERAL IV IS ON RIGHT FOREARM SITE IS PATENT NO SIGN OF REDNESS OR INFILTRATION.PATIENT 
ON LEVOPHED AT 4 MCG/MIN,PATIENT ON CONTINOUS FEEDING WITH VITAL  INFUSING THRU HIS 
PEG.ABDOMEN IS SOFT NOT DISTENDED HAS ACTIVE BOWEL SOUND.PATIENT ON CONTACT ISOLATION FOR 
MRSA OF NARES.

## 2020-01-22 NOTE — NUR
TOLERATING LEVOPHED AT 3 MCG/MIN BLOOD PRESSURE IS STAYING STABLE.PATIENT ACCUCHECK IS 94 NO 
COVERAGE NEEDED.

## 2020-01-22 NOTE — NUR
CLOSING NOTE

PT LAYING IN BED SLEEPING. NO SIGNS OR SYMPTOMS OF DISTRESS NOTED. SBAR REPORT GIVEN TO CLOTILDE FAJARDO. CARE ENDORSED.

## 2020-01-22 NOTE — NUR
OPENING NOTE

Report received from SCOTTY Avalos. Patient resting in the bed with eyes closed. No acute 
distress. On O2 2L/min via NC. HOB elevated. On GT feeding of Vital AF at 70ml/hr. Skin warm 
and dry to touch. Iv intact to RFA, no redness, no swelling, no drainage. On Levophed at 
6mcg/min and 1/2NS at 30ml/hr, infusing well. F/C intact, drain gravity. On contact 
isolation. Safety measure maintained. Call light within reached. Will continue to monitor.

## 2020-01-22 NOTE — NUR
Dietitian Recommendations 



* Recommend Jevity 1.5 at 65 ml/hr, Free Water Flush: 175 ml Q6h via GT

Provides: 2340 kcal/day, 100 gm protein/day, and 1886 ml free water/day

Meets: 94% of upper end of estimated caloric needs and 94% of lower end of estimated 
protein needs 

LP, RD



Please refer to Nutrition F/U for details.

## 2020-01-22 NOTE — NUR
OPENING NOTE

SBAR REPORT RECEIVED FROM HORTENSIA FAJARDO. CARE ASSUMED. PT LAYING IN BED. PT ANO X1. PT ON 1 L 
NC.  PT SINUS TACHYCARDIA ON MONITOR. PT HAS 20 G IV TO RIGHT FOREARM RUNNING LEVOPHED @ 
4MCG/KG/MIN AND 1/2 NS @ 30 ML/HR. NO EDEMA NOTED. ABDOMEN SOFT NON DISTENDED. G TUBE IN 
PLACE RUNNING VITAL AF @ 70 ML/HR. NO RESIDUAL OBTAINED. LAUGHLIN CATHETER IN PLACE FLOWING TO 
GRAVITY. URINE YELLOW AND CLEAR. SKIN INTACT. BED LOCKED IN LOWEST POSITION. SAFETY 
PRECAUTIONS IN PLACE. CALL LIGHT WITHIN REACH. WILL CONTINUE TO MONITOR.

## 2020-01-22 NOTE — NUR
ROUND

Patient resting in the bed with eyes closed. No acute distress. Continue on O2 via NC. IV 
intact, NS at 100ml/hr and Levophed drip at 6mcg/min, infusing well. F/C intact, drain 
gravity. Continue on contact isolation. Safety measure maintained. Call light within 
reached. Continue to monitor.

## 2020-01-22 NOTE — NUR
BREATHING TREATMENT

Patient receiving breathing treatment at this time, admitted by RT. Patient no acute 
distress. IV intact, NS at 100ml/hr and Levophed drip at 6mcg/min, infusing well. Contact 
isolation maintained. Safety measure maintained. Call light within reached. Continue to 
monitor.

## 2020-01-23 VITALS — SYSTOLIC BLOOD PRESSURE: 143 MMHG

## 2020-01-23 VITALS — SYSTOLIC BLOOD PRESSURE: 136 MMHG

## 2020-01-23 VITALS — SYSTOLIC BLOOD PRESSURE: 128 MMHG

## 2020-01-23 VITALS — SYSTOLIC BLOOD PRESSURE: 137 MMHG

## 2020-01-23 VITALS — SYSTOLIC BLOOD PRESSURE: 125 MMHG

## 2020-01-23 VITALS — SYSTOLIC BLOOD PRESSURE: 121 MMHG

## 2020-01-23 VITALS — SYSTOLIC BLOOD PRESSURE: 135 MMHG

## 2020-01-23 VITALS — SYSTOLIC BLOOD PRESSURE: 106 MMHG

## 2020-01-23 VITALS — SYSTOLIC BLOOD PRESSURE: 107 MMHG

## 2020-01-23 VITALS — SYSTOLIC BLOOD PRESSURE: 113 MMHG

## 2020-01-23 VITALS — SYSTOLIC BLOOD PRESSURE: 101 MMHG

## 2020-01-23 VITALS — SYSTOLIC BLOOD PRESSURE: 132 MMHG

## 2020-01-23 VITALS — SYSTOLIC BLOOD PRESSURE: 110 MMHG

## 2020-01-23 VITALS — SYSTOLIC BLOOD PRESSURE: 127 MMHG

## 2020-01-23 VITALS — SYSTOLIC BLOOD PRESSURE: 117 MMHG

## 2020-01-23 VITALS — SYSTOLIC BLOOD PRESSURE: 126 MMHG

## 2020-01-23 VITALS — SYSTOLIC BLOOD PRESSURE: 112 MMHG

## 2020-01-23 VITALS — SYSTOLIC BLOOD PRESSURE: 93 MMHG

## 2020-01-23 VITALS — SYSTOLIC BLOOD PRESSURE: 124 MMHG

## 2020-01-23 VITALS — SYSTOLIC BLOOD PRESSURE: 114 MMHG

## 2020-01-23 LAB
ALBUMIN SERPL BCP-MCNC: 2 G/DL (ref 3.4–4.8)
ALT SERPL W P-5'-P-CCNC: 28 U/L (ref 12–78)
ANION GAP SERPL CALCULATED.3IONS-SCNC: 5 MMOL/L (ref 5–15)
AST SERPL W P-5'-P-CCNC: 21 U/L (ref 10–37)
BASOPHILS # BLD AUTO: 0 K/UL (ref 0–0.2)
BASOPHILS NFR BLD AUTO: 0.3 % (ref 0–2)
BILIRUB SERPL-MCNC: 0.3 MG/DL (ref 0–1)
BUN SERPL-MCNC: 14 MG/DL (ref 8–21)
CALCIUM SERPL-MCNC: 7.9 MG/DL (ref 8.4–11)
CHLORIDE SERPL-SCNC: 103 MMOL/L (ref 98–107)
CREAT SERPL-MCNC: 0.42 MG/DL (ref 0.55–1.3)
EOSINOPHIL # BLD AUTO: 0.2 K/UL (ref 0–0.4)
EOSINOPHIL NFR BLD AUTO: 3.4 % (ref 0–4)
ERYTHROCYTE [DISTWIDTH] IN BLOOD BY AUTOMATED COUNT: 14.6 % (ref 9–15)
GFR SERPL CREATININE-BSD FRML MDRD: (no result) ML/MIN (ref 90–?)
GLUCOSE SERPL-MCNC: 106 MG/DL (ref 70–99)
HCT VFR BLD AUTO: 32.6 % (ref 36–54)
HGB BLD-MCNC: 11 G/DL (ref 14–18)
LYMPHOCYTES # BLD AUTO: 0.8 K/UL (ref 1–5.5)
LYMPHOCYTES NFR BLD AUTO: 15.4 % (ref 20.5–51.5)
MCH RBC QN AUTO: 34 PG (ref 27–31)
MCHC RBC AUTO-ENTMCNC: 34 % (ref 32–36)
MCV RBC AUTO: 102 FL (ref 79–98)
MONOCYTES # BLD MANUAL: 0.7 K/UL (ref 0–1)
MONOCYTES # BLD MANUAL: 13.6 % (ref 1.7–9.3)
NEUTROPHILS # BLD AUTO: 3.6 K/UL (ref 1.8–7.7)
NEUTROPHILS NFR BLD AUTO: 67.3 % (ref 40–70)
PLATELET # BLD AUTO: 103 K/UL (ref 130–430)
POTASSIUM SERPL-SCNC: 3.5 MMOL/L (ref 3.5–5.1)
RBC # BLD AUTO: 3.19 MIL/UL (ref 4.2–6.2)
SODIUM SERPLBLD-SCNC: 134 MMOL/L (ref 136–145)
WBC # BLD AUTO: 5.4 K/UL (ref 4.8–10.8)

## 2020-01-23 RX ADMIN — DEXTROSE SCH MLS/HR: 50 INJECTION, SOLUTION INTRAVENOUS at 20:47

## 2020-01-23 RX ADMIN — LEVALBUTEROL SCH MG: 1.25 SOLUTION, CONCENTRATE RESPIRATORY (INHALATION) at 23:36

## 2020-01-23 RX ADMIN — RIVAROXABAN SCH MG: 20 TABLET, FILM COATED ORAL at 17:18

## 2020-01-23 RX ADMIN — SODIUM CHLORIDE SCH MLS/HR: 9 INJECTION, SOLUTION INTRAVENOUS at 12:43

## 2020-01-23 RX ADMIN — HUMAN INSULIN PRN UNITS: 100 INJECTION, SOLUTION SUBCUTANEOUS at 17:27

## 2020-01-23 RX ADMIN — Medication SCH EA: at 12:32

## 2020-01-23 RX ADMIN — DOCUSATE SODIUM LIQUID SCH MG: 100 LIQUID ORAL at 20:46

## 2020-01-23 RX ADMIN — DOCUSATE SODIUM LIQUID SCH MG: 100 LIQUID ORAL at 08:19

## 2020-01-23 RX ADMIN — SODIUM CHLORIDE SCH MLS/HR: 0.9 INJECTION, SOLUTION INTRAVENOUS at 17:18

## 2020-01-23 RX ADMIN — ATORVASTATIN CALCIUM SCH MG: 20 TABLET, FILM COATED ORAL at 20:46

## 2020-01-23 RX ADMIN — PREDNISONE SCH MG: 10 TABLET ORAL at 20:46

## 2020-01-23 RX ADMIN — Medication SCH EA: at 05:56

## 2020-01-23 RX ADMIN — LEVALBUTEROL SCH MG: 1.25 SOLUTION, CONCENTRATE RESPIRATORY (INHALATION) at 07:52

## 2020-01-23 RX ADMIN — Medication SCH MG: at 21:00

## 2020-01-23 RX ADMIN — FAMOTIDINE SCH MG: 20 TABLET, FILM COATED ORAL at 08:19

## 2020-01-23 RX ADMIN — LEVALBUTEROL SCH MG: 1.25 SOLUTION, CONCENTRATE RESPIRATORY (INHALATION) at 20:49

## 2020-01-23 RX ADMIN — Medication SCH MG: at 08:20

## 2020-01-23 RX ADMIN — Medication SCH EA: at 17:24

## 2020-01-23 RX ADMIN — PREDNISONE SCH MG: 10 TABLET ORAL at 08:20

## 2020-01-23 RX ADMIN — SODIUM CHLORIDE SCH MLS/HR: 0.9 INJECTION, SOLUTION INTRAVENOUS at 12:26

## 2020-01-23 RX ADMIN — ACETAMINOPHEN PRN MG: 650 SOLUTION ORAL at 03:48

## 2020-01-23 RX ADMIN — LEVALBUTEROL SCH MG: 1.25 SOLUTION, CONCENTRATE RESPIRATORY (INHALATION) at 11:37

## 2020-01-23 RX ADMIN — LEVALBUTEROL SCH MG: 1.25 SOLUTION, CONCENTRATE RESPIRATORY (INHALATION) at 17:07

## 2020-01-23 RX ADMIN — SODIUM CHLORIDE SCH MLS/HR: 0.9 INJECTION, SOLUTION INTRAVENOUS at 02:12

## 2020-01-23 RX ADMIN — DEXTROSE SCH MLS/HR: 50 INJECTION, SOLUTION INTRAVENOUS at 08:18

## 2020-01-23 RX ADMIN — SODIUM CHLORIDE SCH MLS/HR: 9 INJECTION, SOLUTION INTRAVENOUS at 04:30

## 2020-01-23 RX ADMIN — LEVALBUTEROL SCH MG: 1.25 SOLUTION, CONCENTRATE RESPIRATORY (INHALATION) at 02:50

## 2020-01-23 RX ADMIN — SODIUM CHLORIDE SCH MLS/HR: 9 INJECTION, SOLUTION INTRAVENOUS at 00:34

## 2020-01-23 NOTE — NUR
Discharge Planning:

DCP faxed pt referral to [facility] [place] (f [phone] p [phone]) DCP to follow up

## 2020-01-23 NOTE — NUR
pt.assessed.v/s assessed;values w/in normal limits.g-tube intact;patent;g-tube feed 
infusing.iv access intact;patent;g-tube feed infusing.

ballard cath intact;patent;urine content present.per flacc pain mgx;pt/absent facial 
grimaces/body posturing.general status stable.respiratory status;labored;02-sat%=96%.call 
light/telephone placed w/in reach of the pt.

-------------------------------------------------------------------------------

Addendum: 01/24/20 at 0240 by David Medina RN

-------------------------------------------------------------------------------

telephONED kimberly;[place].I SPOKE W/JACEY.TO CONFIRM PT.TRANSFER.[facility].JACEY STATED THE 
PT.

WILL BE POSSIBLE TRANSFER:01/24/20;FRIDAY.NSG TO F/U.

## 2020-01-23 NOTE — NUR
pt.assessed.v/s assessed;values w/in normal limits.g-tube intact;patent;g-tube feed 
infusing.iv acces intact;';patent;iv fluids infusing.

ballard cath intact;patent;urine content present.pt.assessed for cleanliness.pt 
repositioned.per flacc pain mgx;pt.absent facial grimaces/body posturing.general status 
stable.respiratory status stable;labored;02-sat%=96%.call light/telephone placed w/in reach 
of the pt.

## 2020-01-23 NOTE — NUR
change of shift.pt.presents isolation status;mrsa;nares.pt.presents g-tube;jevity:1.2 
administering.pt.ordered glucerna;

product is not available.

pt.presents o2 therapy via nasal cannulae.administration rate:2l/min.pt.presents iv access 
intact;patent iv fluids infusing.pt.presents ballard cath;pt.presents bph hx.ballard cath to 
remain in place.call light/telephone w/in reach of the 

pt.

## 2020-01-23 NOTE — NUR
ENDORSEMENT / CLOSING NOTE



PROVIDED SHIFT REPORT TO NIGHT RN USING SBAR.



PT RESTING COMFORTABLY.  EXPRESSED NO PAIN OR DISCOMFORT.  NC @ 1L. NS @ 100CC/HR.  , 
RR 36, 72% O2 SAT, 128/78 MAP OF 94. NIGHT RN BEDSIDE.

## 2020-01-23 NOTE — NUR
PT BM



Provided perineal and perianal care.  Changed gown, and supportive linens.  Pt is verbally 
alert and has no complaints during carine-care.  Bed locked and in lowest position.

## 2020-01-23 NOTE — NUR
2100pmedications administered via the g-tube.medications administered absent resistance.i 
have administered maxipime

;abx;ivpb.

## 2020-01-23 NOTE — NUR
Provided partial bedbath (face wash and upper body wash).  Cleaned perineal area and 
perianal.  Pt verbally abusive but due to medical condition.

## 2020-01-23 NOTE — NUR
Rounding



Pt resting with no signs of pain or distress.  Pt verbal and indicated he was cold.  Will 
apply extra blanket. Temperature of 98.6F noted. Bed locked and in lowest position.

## 2020-01-23 NOTE — NUR
AM ASSESSMENT



Pt resting with no signs of pain or distress.  Pt leaning on left side. Able to open eyes on 
verbal command and is A/O x 1.  No skin intact with rhonchi in upper bilateral lobes and 
diminished in lower lobes.  None productive cough noted.  Heals floating.  Temp 98.9.  Bed 
locked and in lowest position with call light in reach.

## 2020-01-23 NOTE — NUR
Levophed DC'd per night RN.  Noted , RR36, O2 Sat 72%, 125/78 BP MAP 94.  Will 
continue to monitor.

## 2020-01-23 NOTE — NUR
Rounding



Pt resting with no signs of pain or distress.  Pt responding to verbal response. A/O x 2.  
Will continue to monitor.  Bed locked and in lowest position with call light in place.

## 2020-01-23 NOTE — NUR
Wound Evaluation:



Wound Consult ordered for Low Naren Score.



Patient evaluated for a low Naren score of 12. Patient was awake, alert, oriented x 1-2 and 
received in a [place] Bed with an Atmos-Air 9000 mattress. Patient needs to be turned in 
bed. Recommend reposition patient every 2 hours with pillow support. Elevate, off-load and 
float bilateral heels with pillows. Offload pressure areas with pillows for pressure 
re-distribution. Perform skin care and monitor skin integrity Q shift. Use moisture barrier 
cream on moisture susceptible areas QID and PRN for soiling. Place patient on a low air-loss 
mattress.

## 2020-01-24 VITALS — SYSTOLIC BLOOD PRESSURE: 116 MMHG

## 2020-01-24 VITALS — SYSTOLIC BLOOD PRESSURE: 92 MMHG

## 2020-01-24 VITALS — SYSTOLIC BLOOD PRESSURE: 94 MMHG

## 2020-01-24 VITALS — SYSTOLIC BLOOD PRESSURE: 112 MMHG

## 2020-01-24 VITALS — SYSTOLIC BLOOD PRESSURE: 105 MMHG

## 2020-01-24 VITALS — SYSTOLIC BLOOD PRESSURE: 126 MMHG

## 2020-01-24 VITALS — SYSTOLIC BLOOD PRESSURE: 119 MMHG

## 2020-01-24 VITALS — SYSTOLIC BLOOD PRESSURE: 109 MMHG

## 2020-01-24 VITALS — SYSTOLIC BLOOD PRESSURE: 108 MMHG

## 2020-01-24 VITALS — SYSTOLIC BLOOD PRESSURE: 88 MMHG

## 2020-01-24 VITALS — SYSTOLIC BLOOD PRESSURE: 90 MMHG

## 2020-01-24 VITALS — SYSTOLIC BLOOD PRESSURE: 104 MMHG

## 2020-01-24 VITALS — SYSTOLIC BLOOD PRESSURE: 121 MMHG

## 2020-01-24 LAB
ANION GAP SERPL CALCULATED.3IONS-SCNC: 10 MMOL/L (ref 5–15)
BASOPHILS # BLD AUTO: 0 K/UL (ref 0–0.2)
BASOPHILS NFR BLD AUTO: 0.4 % (ref 0–2)
BUN SERPL-MCNC: 11 MG/DL (ref 8–21)
CALCIUM SERPL-MCNC: 8.4 MG/DL (ref 8.4–11)
CHLORIDE SERPL-SCNC: 105 MMOL/L (ref 98–107)
CREAT SERPL-MCNC: 0.39 MG/DL (ref 0.55–1.3)
EOSINOPHIL # BLD AUTO: 0 K/UL (ref 0–0.4)
EOSINOPHIL NFR BLD AUTO: 0.6 % (ref 0–4)
ERYTHROCYTE [DISTWIDTH] IN BLOOD BY AUTOMATED COUNT: 14.4 % (ref 9–15)
GFR SERPL CREATININE-BSD FRML MDRD: (no result) ML/MIN (ref 90–?)
GLUCOSE SERPL-MCNC: 109 MG/DL (ref 70–99)
HCT VFR BLD AUTO: 32.1 % (ref 36–54)
HGB BLD-MCNC: 10.7 G/DL (ref 14–18)
LYMPHOCYTES # BLD AUTO: 0.9 K/UL (ref 1–5.5)
LYMPHOCYTES NFR BLD AUTO: 18.9 % (ref 20.5–51.5)
MCH RBC QN AUTO: 34 PG (ref 27–31)
MCHC RBC AUTO-ENTMCNC: 33 % (ref 32–36)
MCV RBC AUTO: 103 FL (ref 79–98)
MONOCYTES # BLD MANUAL: 0.4 K/UL (ref 0–1)
MONOCYTES # BLD MANUAL: 9.4 % (ref 1.7–9.3)
NEUTROPHILS # BLD AUTO: 3.4 K/UL (ref 1.8–7.7)
NEUTROPHILS NFR BLD AUTO: 70.7 % (ref 40–70)
PLATELET # BLD AUTO: 110 K/UL (ref 130–430)
POTASSIUM SERPL-SCNC: 4 MMOL/L (ref 3.5–5.1)
RBC # BLD AUTO: 3.11 MIL/UL (ref 4.2–6.2)
SODIUM SERPLBLD-SCNC: 140 MMOL/L (ref 136–145)
WBC # BLD AUTO: 4.7 K/UL (ref 4.8–10.8)

## 2020-01-24 RX ADMIN — LEVALBUTEROL SCH MG: 1.25 SOLUTION, CONCENTRATE RESPIRATORY (INHALATION) at 11:30

## 2020-01-24 RX ADMIN — Medication SCH EA: at 00:10

## 2020-01-24 RX ADMIN — Medication SCH MG: at 08:56

## 2020-01-24 RX ADMIN — DEXTROSE SCH MLS/HR: 50 INJECTION, SOLUTION INTRAVENOUS at 08:54

## 2020-01-24 RX ADMIN — SODIUM CHLORIDE SCH MLS/HR: 9 INJECTION, SOLUTION INTRAVENOUS at 10:09

## 2020-01-24 RX ADMIN — SODIUM CHLORIDE SCH MLS/HR: 9 INJECTION, SOLUTION INTRAVENOUS at 00:11

## 2020-01-24 RX ADMIN — LEVALBUTEROL SCH MG: 1.25 SOLUTION, CONCENTRATE RESPIRATORY (INHALATION) at 08:16

## 2020-01-24 RX ADMIN — Medication SCH EA: at 12:13

## 2020-01-24 RX ADMIN — RIVAROXABAN SCH MG: 20 TABLET, FILM COATED ORAL at 16:12

## 2020-01-24 RX ADMIN — Medication SCH EA: at 05:42

## 2020-01-24 RX ADMIN — SODIUM CHLORIDE SCH MLS/HR: 0.9 INJECTION, SOLUTION INTRAVENOUS at 10:09

## 2020-01-24 RX ADMIN — SODIUM CHLORIDE SCH MLS/HR: 0.9 INJECTION, SOLUTION INTRAVENOUS at 02:50

## 2020-01-24 RX ADMIN — FAMOTIDINE SCH MG: 20 TABLET, FILM COATED ORAL at 08:56

## 2020-01-24 RX ADMIN — LEVALBUTEROL SCH MG: 1.25 SOLUTION, CONCENTRATE RESPIRATORY (INHALATION) at 03:55

## 2020-01-24 RX ADMIN — LEVALBUTEROL SCH MG: 1.25 SOLUTION, CONCENTRATE RESPIRATORY (INHALATION) at 15:24

## 2020-01-24 RX ADMIN — PREDNISONE SCH MG: 10 TABLET ORAL at 08:54

## 2020-01-24 RX ADMIN — DOCUSATE SODIUM LIQUID SCH MG: 100 LIQUID ORAL at 08:54

## 2020-01-24 NOTE — NUR
pt.assessed/v/s assessed.pt.assessed for cleanliness.pt.repositioned.g-tube 
intact;patent.g-tube feed infusing.iv access intact;patent iv fluids infusing.ballard cath 
intact;patent;urine content present.general status stable.respiratory status;

labored;02-sat%=96%.call light/telephone placed w/in reach of the pt.

## 2020-01-24 NOTE — NUR
Called [facility] and gave report to nurse Cazares using SBAR. Confirmed p/u time per 
[facility]

## 2020-01-24 NOTE — NUR
pt.assessed.v/s assessed;values w/in normal limits.per flacc;pain mgx pt.absent facial 
grimaces/body posturing.

g-tube intact;patent;i have cleaned the g-tube insertion site.i have changed the g-tube 
dsg.iv access intact;

patent;

iv fluids infusing.

ballard cath intact;patent;urine content present.pt.assessed for cleanliness.pt.cleaned.pt 
repositioned.general status 

stable.

respiratory status labored.call light/telephone placed w/in reach of the pt.

## 2020-01-24 NOTE — NUR
pt.assessed.v/s assessed;values w/in normal limits.g-tube intact;patent;g-tube feed 
infusing.iv access intact;patent iv fluids 

infusing ballard cath intact;patetn;urine content present .general status stable.respiratory 
status;labored;o2-sat%=96%.pt.assessed for cleanliness.

pt.repositioned.per flacc pain mgx;pt.absent facial grimaces /body posturing.call 
light/telephone placed w/in reach of the pt.

## 2020-01-24 NOTE — NUR
PROVIDED PERIANAL AND PERINEAL CARE DUE TO BM.  CHANGED GOWN AND SUPPORTIVE BEDDING. PT 
TOLERATED WELL.

## 2020-01-24 NOTE — NUR
DC PLANNING

Received call from Peggy in ICU spoke w brother Dale Griffiths & agreeable w transfer to 
[facility] today. Called & informed [facility], ph [phone], states pt 
going to room 211A under Dr Shultz, #for report [phone] with preference for  
time 7pm. 

-------------------------------------------------------------------------------

Addendum: 01/24/20 at 1536 by Nancy Reyes RN

-------------------------------------------------------------------------------

Called & set up transportation w [facility], ph [phone], for 7pm . [place] 
[facility] [place] informed [place]'s nurse & charge nurse.

## 2020-01-24 NOTE — NUR
DC Planning: per [facility]: the pt is accepted and will have bed available today. 
Laurita ADAM made aware.

## 2020-01-24 NOTE — NUR
Pt's brother, Dale Griffiths, returned call to ICU and verified approval for transfer 
witnessed by second RN.

## 2020-01-24 NOTE — NUR
DC PLANNING

Called & left msg w niece Samantha Montez, ph [phone], earlier @1130 to discuss [facility]. Called 
& spoke w [facility], ph [phone], states she left msg w niece & brother to get 
consent for transfer. States will give bed once family is able to be contacted. Have not 
received call back yet from Samantha called & left another msg w Niece & called & left msg w 
brother Dale Griffiths, ph [phone].

## 2020-01-24 NOTE — NUR
AM ASSESSMENT



PT AWAKE AND ABLE TO COMMUNICATE.  NO PAIN EXPRESSED.  HR 97, RR 29, O2 92%, /66.  BED 
LOCKED AND IN LOWEST POSITION WITH CALL LIGHT IN PLACE.

## 2020-01-24 NOTE — NUR
Dietitian Recommendations 



     * Recommend continue Jevity 1.5 at 65 ml/hr, Free Water Flush: 175 ml Q6h via GT



Please see Nutrition Follow Up for further details.



LT, RD

## 2020-01-24 NOTE — NUR
JACEY CALLED FROM [facility] REGARDING OBTAINING CONSENT FROM FAMILY MEMBER.  JACEY STATED SHE 
COULD NOT GET IN TOUCH WITH ANY FAMILY MEMBER AND SHE WILL CONTINUE TO.

## 2020-01-24 NOTE — NUR
Nutrition F/U



RD reviewed pt's current EMR record including diet Hx, physician notes, nursing notes, 
pertinent labs/meds/procedures, care trends, and care activity.



Admission Dx: Pneumonia, septic shock

Pt also found w/ aspiration pneumonia, septic shock, acute respiratory failure, DM, Hx of 
DVT of LE, mild anemia per physician notes. 

MD notes septic shock resolved, +MRSA, pt remains on contact isolation.



Current Diet Order/Nutrition Support: Jevity 1.5 at 65 ml/hr, Free Water Flush: 175 ml Q6h 
via GT

Provides: 2340 kcal/day, 100 gm protein/day, and 1886 ml free water/day

Meets: 94% of upper end of estimated caloric needs and 94% of lower end of estimated protein 
needs 



Subjective Info: Pt seen resting in bed, TF running bedside per physician order with 4ml 
enfused. RN notes 5ml residuals today with 1 BM, and 10ml residuals yesterday. Seen by Wound 
Care Specialist yesterday for low daja score of 12, no pressure injuries noted. Pt appears 
to be tolerating current TF regimen, and is meeting 94% of upper caloric needs and 94% of 
lower end of estimated protein needs.



Estimated Energy Expenditure (kcals/day) 

     3119-6088 kcal/day (30-35 kcal/kg CBW for sepsis)

Estimated Protein Required (g/day) 

     106-142 gm/day (1.5-2 gm/kg CBW for sepsis)

Estimated Fluid Required (l/day) 

     1.8-2.1 L/day (25-30 ml/kg CBW for maintenance)

Problem/Etiology/Signs/Symptoms 

     Increased nutrient needs r/t metabolic demands AEB estimated nutrient needs for sepsis. 


          *ongoing

Expected Outcomes/Goals 

     Monitor EN tolerance and intake w/ goal of pt meeting at least 75% of 

     estimated nutritional needs, labs trending WNL, normal GI function, skin 

     integrity/wt maintenance.

Dietitian Recommendations 

     * Recommend Jevity 1.5 at 65 ml/hr, Free Water Flush: 175 ml Q6h via GT

Follow Up 

     High Risk: F/U in 2-3 days

## 2020-01-24 NOTE — NUR
pt.assessed.v/s assessed;values w/in normal limits.pt.assessed for 
cleanliness.pt.repositioned.g-tube intact;patent;g-tube feed infusing.i have 
administered;h2o-flush;175ml.administered absent resistance.ballard cath intact;patent;urine 
content present.iv access intact;patent;iv fluids infusing.general status stable.respiratory 
status;labored;02-sat%=-96%.call light.telephone placed 

w/in reach of the pt.i have assessed the blood glucose;value:126mg/dl.i have apprised the 
pt.of the value.

-------------------------------------------------------------------------------

Addendum: 01/24/20 at 0208 by David Medina RN

-------------------------------------------------------------------------------

per flacc;pain mgx;pt/absent facial grimaces/body posturing.

## 2020-01-24 NOTE — NUR
TO



DR VELÁSQUEZ CALLED STATING PT IS NOW [facility] STATUS ALONG WITH ORDERS TO TRANSFER TO [place].

## 2020-03-10 ENCOUNTER — HOSPITAL ENCOUNTER (EMERGENCY)
Dept: HOSPITAL 4 - SED | Age: 80
LOS: 1 days | Discharge: HOME | End: 2020-03-11
Payer: COMMERCIAL

## 2020-03-10 VITALS — WEIGHT: 170 LBS | BODY MASS INDEX: 26.68 KG/M2 | HEIGHT: 67 IN

## 2020-03-10 VITALS — SYSTOLIC BLOOD PRESSURE: 113 MMHG

## 2020-03-10 DIAGNOSIS — E11.9: ICD-10-CM

## 2020-03-10 DIAGNOSIS — K62.5: Primary | ICD-10-CM

## 2020-03-10 DIAGNOSIS — Z79.899: ICD-10-CM

## 2020-03-10 DIAGNOSIS — J44.9: ICD-10-CM

## 2020-03-10 DIAGNOSIS — Z88.8: ICD-10-CM

## 2020-03-10 LAB
ALBUMIN SERPL BCP-MCNC: 2.7 G/DL (ref 3.4–4.8)
ALT SERPL W P-5'-P-CCNC: 41 U/L (ref 12–78)
ANION GAP SERPL CALCULATED.3IONS-SCNC: 8 MMOL/L (ref 5–15)
AST SERPL W P-5'-P-CCNC: 28 U/L (ref 10–37)
BASOPHILS # BLD AUTO: 0.1 K/UL (ref 0–0.2)
BASOPHILS NFR BLD AUTO: 0.7 % (ref 0–2)
BILIRUB SERPL-MCNC: 0.5 MG/DL (ref 0–1)
BUN SERPL-MCNC: 28 MG/DL (ref 8–21)
CALCIUM SERPL-MCNC: 8.4 MG/DL (ref 8.4–11)
CHLORIDE SERPL-SCNC: 100 MMOL/L (ref 98–107)
CREAT SERPL-MCNC: 0.68 MG/DL (ref 0.55–1.3)
EOSINOPHIL # BLD AUTO: 0.4 K/UL (ref 0–0.4)
EOSINOPHIL NFR BLD AUTO: 3.9 % (ref 0–4)
ERYTHROCYTE [DISTWIDTH] IN BLOOD BY AUTOMATED COUNT: 14.8 % (ref 9–15)
GFR SERPL CREATININE-BSD FRML MDRD: (no result) ML/MIN (ref 90–?)
GLUCOSE SERPL-MCNC: 85 MG/DL (ref 70–99)
HCT VFR BLD AUTO: 39.4 % (ref 36–54)
HGB BLD-MCNC: 13.3 G/DL (ref 14–18)
INR PPP: 1 (ref 0.8–1.2)
LYMPHOCYTES # BLD AUTO: 2 K/UL (ref 1–5.5)
LYMPHOCYTES NFR BLD AUTO: 20.5 % (ref 20.5–51.5)
MCH RBC QN AUTO: 34 PG (ref 27–31)
MCHC RBC AUTO-ENTMCNC: 34 % (ref 32–36)
MCV RBC AUTO: 100 FL (ref 79–98)
MONOCYTES # BLD MANUAL: 0.7 K/UL (ref 0–1)
MONOCYTES # BLD MANUAL: 7.3 % (ref 1.7–9.3)
NEUTROPHILS # BLD AUTO: 6.5 K/UL (ref 1.8–7.7)
NEUTROPHILS NFR BLD AUTO: 67.6 % (ref 40–70)
PLATELET # BLD AUTO: 225 K/UL (ref 130–430)
POTASSIUM SERPL-SCNC: 3.7 MMOL/L (ref 3.5–5.1)
PROTHROMBIN TIME: 10.5 SECS (ref 9.5–12.5)
RBC # BLD AUTO: 3.97 MIL/UL (ref 4.2–6.2)
SODIUM SERPLBLD-SCNC: 139 MMOL/L (ref 136–145)
WBC # BLD AUTO: 9.6 K/UL (ref 4.8–10.8)

## 2020-03-10 PROCEDURE — 80053 COMPREHEN METABOLIC PANEL: CPT

## 2020-03-10 PROCEDURE — 82272 OCCULT BLD FECES 1-3 TESTS: CPT

## 2020-03-10 PROCEDURE — 99285 EMERGENCY DEPT VISIT HI MDM: CPT

## 2020-03-10 PROCEDURE — 85730 THROMBOPLASTIN TIME PARTIAL: CPT

## 2020-03-10 PROCEDURE — 93005 ELECTROCARDIOGRAM TRACING: CPT

## 2020-03-10 PROCEDURE — 36415 COLL VENOUS BLD VENIPUNCTURE: CPT

## 2020-03-10 PROCEDURE — 71045 X-RAY EXAM CHEST 1 VIEW: CPT

## 2020-03-10 PROCEDURE — 85025 COMPLETE CBC W/AUTO DIFF WBC: CPT

## 2020-03-10 PROCEDURE — 85610 PROTHROMBIN TIME: CPT

## 2020-03-10 NOTE — NUR
Pt BIB BLS from [facility], placed to ER bed 03, triaged at bedside and report 
given to SCOTTY Bryan.

## 2020-03-10 NOTE — NUR
Placed in room 03. Placed on cardiac monitor, blood pressure machine and pulse 
oximeter. To gown for exam. Side rails up.

## 2020-03-10 NOTE — NUR
Pt BIB EMS from [facility] with c/o rectal bleeding. Pt A&Ox1. Per facility, 
pt had one episode of bright red bleeding from rectum today around 4 pm. Pt BP 
upon arrival is 113/62. Pt skin intact, warm to touch, color WNL. Pt breath 
sounds bilaterally clear with no use of accessory muscles. Will continue to 
monitor.

## 2020-03-11 VITALS — SYSTOLIC BLOOD PRESSURE: 100 MMHG

## 2020-03-11 NOTE — NUR
Patient given written and verbal discharge instructions and verbalizes 
understanding.  ER MD discussed with patient the results and treatment 
provided. Patient in stable condition. ID arm band removed. IV catheter removed 
intact and dressing applied, no active bleeding.

No Rx given. Patient educated on pain management and to follow up with PMD. 
Pain Scale 0/10.

Opportunity for questions provided and answered. Medication side effect fact 
sheet provided.

## 2020-03-20 ENCOUNTER — HOSPITAL ENCOUNTER (INPATIENT)
Dept: HOSPITAL 4 - SED | Age: 80
LOS: 3 days | Discharge: NURSING, REHAB OR LONG-TERM CARE | DRG: 689 | End: 2020-03-23
Attending: FAMILY MEDICINE | Admitting: FAMILY MEDICINE
Payer: COMMERCIAL

## 2020-03-20 VITALS — SYSTOLIC BLOOD PRESSURE: 105 MMHG

## 2020-03-20 VITALS — SYSTOLIC BLOOD PRESSURE: 107 MMHG

## 2020-03-20 VITALS — BODY MASS INDEX: 24.8 KG/M2 | SYSTOLIC BLOOD PRESSURE: 113 MMHG | WEIGHT: 158 LBS | HEIGHT: 67 IN

## 2020-03-20 VITALS — SYSTOLIC BLOOD PRESSURE: 99 MMHG

## 2020-03-20 VITALS — SYSTOLIC BLOOD PRESSURE: 109 MMHG

## 2020-03-20 VITALS — SYSTOLIC BLOOD PRESSURE: 100 MMHG

## 2020-03-20 DIAGNOSIS — E43: ICD-10-CM

## 2020-03-20 DIAGNOSIS — Z93.1: ICD-10-CM

## 2020-03-20 DIAGNOSIS — Z79.899: ICD-10-CM

## 2020-03-20 DIAGNOSIS — E11.9: ICD-10-CM

## 2020-03-20 DIAGNOSIS — D68.59: ICD-10-CM

## 2020-03-20 DIAGNOSIS — I48.0: ICD-10-CM

## 2020-03-20 DIAGNOSIS — R31.9: ICD-10-CM

## 2020-03-20 DIAGNOSIS — N40.0: ICD-10-CM

## 2020-03-20 DIAGNOSIS — Z86.73: ICD-10-CM

## 2020-03-20 DIAGNOSIS — J44.9: ICD-10-CM

## 2020-03-20 DIAGNOSIS — B96.20: ICD-10-CM

## 2020-03-20 DIAGNOSIS — G93.40: ICD-10-CM

## 2020-03-20 DIAGNOSIS — N39.0: Primary | ICD-10-CM

## 2020-03-20 DIAGNOSIS — I10: ICD-10-CM

## 2020-03-20 LAB
ALBUMIN SERPL BCP-MCNC: 2.5 G/DL (ref 3.4–4.8)
ALT SERPL W P-5'-P-CCNC: 50 U/L (ref 12–78)
ANION GAP SERPL CALCULATED.3IONS-SCNC: 6 MMOL/L (ref 5–15)
APPEARANCE UR: (no result)
AST SERPL W P-5'-P-CCNC: 43 U/L (ref 10–37)
BACTERIA URNS QL MICRO: (no result) /HPF
BASOPHILS # BLD AUTO: 0 K/UL (ref 0–0.2)
BASOPHILS NFR BLD AUTO: 0.3 % (ref 0–2)
BILIRUB SERPL-MCNC: 0.4 MG/DL (ref 0–1)
BILIRUB UR QL STRIP: (no result)
BUN SERPL-MCNC: 35 MG/DL (ref 8–21)
CALCIUM SERPL-MCNC: 8.2 MG/DL (ref 8.4–11)
CHLORIDE SERPL-SCNC: 105 MMOL/L (ref 98–107)
COLOR UR: (no result)
CREAT SERPL-MCNC: 0.68 MG/DL (ref 0.55–1.3)
EOSINOPHIL # BLD AUTO: 0 K/UL (ref 0–0.4)
EOSINOPHIL NFR BLD AUTO: 0.1 % (ref 0–4)
ERYTHROCYTE [DISTWIDTH] IN BLOOD BY AUTOMATED COUNT: 15 % (ref 9–15)
GFR SERPL CREATININE-BSD FRML MDRD: (no result) ML/MIN (ref 90–?)
GLUCOSE SERPL-MCNC: 94 MG/DL (ref 70–99)
GLUCOSE UR STRIP-MCNC: NEGATIVE MG/DL
HCT VFR BLD AUTO: 39.3 % (ref 36–54)
HGB BLD-MCNC: 12.9 G/DL (ref 14–18)
HGB UR QL STRIP: (no result)
INR PPP: 1.1 (ref 0.8–1.2)
KETONES UR STRIP-MCNC: NEGATIVE MG/DL
LEUKOCYTE ESTERASE UR QL STRIP: (no result)
LYMPHOCYTES # BLD AUTO: 1.5 K/UL (ref 1–5.5)
LYMPHOCYTES NFR BLD AUTO: 13.6 % (ref 20.5–51.5)
MCH RBC QN AUTO: 33 PG (ref 27–31)
MCHC RBC AUTO-ENTMCNC: 33 % (ref 32–36)
MCV RBC AUTO: 100 FL (ref 79–98)
MONOCYTES # BLD MANUAL: 0.8 K/UL (ref 0–1)
MONOCYTES # BLD MANUAL: 7.3 % (ref 1.7–9.3)
NEUTROPHILS # BLD AUTO: 8.5 K/UL (ref 1.8–7.7)
NEUTROPHILS NFR BLD AUTO: 78.7 % (ref 40–70)
NITRITE UR QL STRIP: POSITIVE
PH UR STRIP: 8.5 [PH] (ref 5–8)
PLATELET # BLD AUTO: 149 K/UL (ref 130–430)
POTASSIUM SERPL-SCNC: 4.3 MMOL/L (ref 3.5–5.1)
PROT UR QL STRIP: (no result)
PROTHROMBIN TIME: 11.1 SECS (ref 9.5–12.5)
RBC # BLD AUTO: 3.93 MIL/UL (ref 4.2–6.2)
RBC #/AREA URNS HPF: >100 /HPF (ref 0–3)
SODIUM SERPLBLD-SCNC: 141 MMOL/L (ref 136–145)
SP GR UR STRIP: <1.005 (ref 1–1.03)
UROBILINOGEN UR STRIP-MCNC: 1 MG/DL (ref 0.2–1)
WBC # BLD AUTO: 10.7 K/UL (ref 4.8–10.8)
WBC #/AREA URNS HPF: >100 /HPF (ref 0–3)

## 2020-03-20 PROCEDURE — G0378 HOSPITAL OBSERVATION PER HR: HCPCS

## 2020-03-20 RX ADMIN — CLOTRIMAZOLE AND BETAMETHASONE DIPROPIONATE SCH GM: 10; .5 CREAM TOPICAL at 22:03

## 2020-03-20 RX ADMIN — DEXTROSE SCH MLS/HR: 50 INJECTION, SOLUTION INTRAVENOUS at 21:57

## 2020-03-20 RX ADMIN — DOCUSATE SODIUM LIQUID SCH MG: 100 LIQUID ORAL at 22:02

## 2020-03-20 RX ADMIN — LEVALBUTEROL SCH MG: 1.25 SOLUTION, CONCENTRATE RESPIRATORY (INHALATION) at 15:24

## 2020-03-20 RX ADMIN — SODIUM CHLORIDE SCH MLS/HR: 9 INJECTION, SOLUTION INTRAVENOUS at 23:01

## 2020-03-20 RX ADMIN — ATORVASTATIN CALCIUM SCH MG: 20 TABLET, FILM COATED ORAL at 22:02

## 2020-03-20 RX ADMIN — SODIUM CHLORIDE SCH MLS/HR: 9 INJECTION, SOLUTION INTRAVENOUS at 17:22

## 2020-03-20 RX ADMIN — LEVALBUTEROL SCH MG: 1.25 SOLUTION, CONCENTRATE RESPIRATORY (INHALATION) at 23:51

## 2020-03-20 RX ADMIN — PREDNISONE SCH MG: 10 TABLET ORAL at 22:02

## 2020-03-20 RX ADMIN — DEXTROSE SCH MLS/HR: 50 INJECTION, SOLUTION INTRAVENOUS at 09:00

## 2020-03-21 VITALS — SYSTOLIC BLOOD PRESSURE: 97 MMHG

## 2020-03-21 VITALS — SYSTOLIC BLOOD PRESSURE: 98 MMHG

## 2020-03-21 VITALS — SYSTOLIC BLOOD PRESSURE: 105 MMHG

## 2020-03-21 VITALS — SYSTOLIC BLOOD PRESSURE: 120 MMHG

## 2020-03-21 VITALS — SYSTOLIC BLOOD PRESSURE: 92 MMHG

## 2020-03-21 RX ADMIN — DOCUSATE SODIUM LIQUID SCH MG: 100 LIQUID ORAL at 20:47

## 2020-03-21 RX ADMIN — DOCUSATE SODIUM LIQUID SCH MG: 100 LIQUID ORAL at 09:42

## 2020-03-21 RX ADMIN — Medication SCH MG: at 09:00

## 2020-03-21 RX ADMIN — LEVALBUTEROL SCH MG: 1.25 SOLUTION, CONCENTRATE RESPIRATORY (INHALATION) at 15:29

## 2020-03-21 RX ADMIN — LEVALBUTEROL SCH MG: 1.25 SOLUTION, CONCENTRATE RESPIRATORY (INHALATION) at 07:25

## 2020-03-21 RX ADMIN — CLOTRIMAZOLE AND BETAMETHASONE DIPROPIONATE SCH GM: 10; .5 CREAM TOPICAL at 09:44

## 2020-03-21 RX ADMIN — PREDNISONE SCH MG: 10 TABLET ORAL at 09:42

## 2020-03-21 RX ADMIN — CLOTRIMAZOLE AND BETAMETHASONE DIPROPIONATE SCH GM: 10; .5 CREAM TOPICAL at 20:49

## 2020-03-21 RX ADMIN — SODIUM CHLORIDE SCH MLS/HR: 9 INJECTION, SOLUTION INTRAVENOUS at 23:14

## 2020-03-21 RX ADMIN — LEVALBUTEROL SCH MG: 1.25 SOLUTION, CONCENTRATE RESPIRATORY (INHALATION) at 22:49

## 2020-03-21 RX ADMIN — DEXTROSE SCH MLS/HR: 50 INJECTION, SOLUTION INTRAVENOUS at 20:48

## 2020-03-21 RX ADMIN — MUPIROCIN SCH GM: 20 OINTMENT TOPICAL at 20:48

## 2020-03-21 RX ADMIN — RIVAROXABAN SCH MG: 10 TABLET, FILM COATED ORAL at 09:42

## 2020-03-21 RX ADMIN — ATORVASTATIN CALCIUM SCH MG: 20 TABLET, FILM COATED ORAL at 20:47

## 2020-03-21 RX ADMIN — PREDNISONE SCH MG: 10 TABLET ORAL at 20:47

## 2020-03-21 RX ADMIN — DEXTROSE SCH MLS/HR: 50 INJECTION, SOLUTION INTRAVENOUS at 09:43

## 2020-03-21 RX ADMIN — SODIUM CHLORIDE SCH MLS/HR: 9 INJECTION, SOLUTION INTRAVENOUS at 06:26

## 2020-03-21 RX ADMIN — SODIUM CHLORIDE SCH MLS/HR: 9 INJECTION, SOLUTION INTRAVENOUS at 22:51

## 2020-03-21 RX ADMIN — FAMOTIDINE SCH MG: 20 TABLET, FILM COATED ORAL at 09:42

## 2020-03-21 RX ADMIN — SODIUM CHLORIDE SCH MLS/HR: 9 INJECTION, SOLUTION INTRAVENOUS at 16:04

## 2020-03-22 VITALS — SYSTOLIC BLOOD PRESSURE: 103 MMHG

## 2020-03-22 VITALS — SYSTOLIC BLOOD PRESSURE: 95 MMHG

## 2020-03-22 VITALS — SYSTOLIC BLOOD PRESSURE: 113 MMHG

## 2020-03-22 VITALS — SYSTOLIC BLOOD PRESSURE: 101 MMHG

## 2020-03-22 LAB
ANION GAP SERPL CALCULATED.3IONS-SCNC: 7 MMOL/L (ref 5–15)
BASOPHILS # BLD AUTO: 0 K/UL (ref 0–0.2)
BASOPHILS NFR BLD AUTO: 0.4 % (ref 0–2)
BUN SERPL-MCNC: 22 MG/DL (ref 8–21)
CALCIUM SERPL-MCNC: 8.2 MG/DL (ref 8.4–11)
CHLORIDE SERPL-SCNC: 106 MMOL/L (ref 98–107)
CREAT SERPL-MCNC: 0.57 MG/DL (ref 0.55–1.3)
EOSINOPHIL # BLD AUTO: 0 K/UL (ref 0–0.4)
EOSINOPHIL NFR BLD AUTO: 0.6 % (ref 0–4)
ERYTHROCYTE [DISTWIDTH] IN BLOOD BY AUTOMATED COUNT: 15.4 % (ref 9–15)
GFR SERPL CREATININE-BSD FRML MDRD: (no result) ML/MIN (ref 90–?)
GLUCOSE SERPL-MCNC: 105 MG/DL (ref 70–99)
HCT VFR BLD AUTO: 33.9 % (ref 36–54)
HGB BLD-MCNC: 11.1 G/DL (ref 14–18)
LYMPHOCYTES # BLD AUTO: 1.1 K/UL (ref 1–5.5)
LYMPHOCYTES NFR BLD AUTO: 17.7 % (ref 20.5–51.5)
MCH RBC QN AUTO: 33 PG (ref 27–31)
MCHC RBC AUTO-ENTMCNC: 33 % (ref 32–36)
MCV RBC AUTO: 101 FL (ref 79–98)
MONOCYTES # BLD MANUAL: 0.4 K/UL (ref 0–1)
MONOCYTES # BLD MANUAL: 6.8 % (ref 1.7–9.3)
NEUTROPHILS # BLD AUTO: 4.8 K/UL (ref 1.8–7.7)
NEUTROPHILS NFR BLD AUTO: 74.5 % (ref 40–70)
PLATELET # BLD AUTO: 102 K/UL (ref 130–430)
POTASSIUM SERPL-SCNC: 4.3 MMOL/L (ref 3.5–5.1)
RBC # BLD AUTO: 3.35 MIL/UL (ref 4.2–6.2)
SODIUM SERPLBLD-SCNC: 137 MMOL/L (ref 136–145)
WBC # BLD AUTO: 6.5 K/UL (ref 4.8–10.8)

## 2020-03-22 RX ADMIN — MUPIROCIN SCH APPLIC: 20 OINTMENT TOPICAL at 21:12

## 2020-03-22 RX ADMIN — CLOTRIMAZOLE AND BETAMETHASONE DIPROPIONATE SCH GM: 10; .5 CREAM TOPICAL at 10:11

## 2020-03-22 RX ADMIN — FAMOTIDINE SCH MG: 20 TABLET, FILM COATED ORAL at 10:08

## 2020-03-22 RX ADMIN — SODIUM CHLORIDE SCH MLS/HR: 9 INJECTION, SOLUTION INTRAVENOUS at 21:11

## 2020-03-22 RX ADMIN — CLOTRIMAZOLE AND BETAMETHASONE DIPROPIONATE SCH GM: 10; .5 CREAM TOPICAL at 21:12

## 2020-03-22 RX ADMIN — MUPIROCIN SCH APPLIC: 20 OINTMENT TOPICAL at 10:07

## 2020-03-22 RX ADMIN — SODIUM CHLORIDE SCH MLS/HR: 9 INJECTION, SOLUTION INTRAVENOUS at 06:02

## 2020-03-22 RX ADMIN — LEVALBUTEROL SCH MG: 1.25 SOLUTION, CONCENTRATE RESPIRATORY (INHALATION) at 15:43

## 2020-03-22 RX ADMIN — PREDNISONE SCH MG: 10 TABLET ORAL at 21:11

## 2020-03-22 RX ADMIN — PREDNISONE SCH MG: 10 TABLET ORAL at 10:07

## 2020-03-22 RX ADMIN — SODIUM CHLORIDE SCH MLS/HR: 9 INJECTION, SOLUTION INTRAVENOUS at 22:46

## 2020-03-22 RX ADMIN — Medication SCH MG: at 09:00

## 2020-03-22 RX ADMIN — LEVALBUTEROL SCH MG: 1.25 SOLUTION, CONCENTRATE RESPIRATORY (INHALATION) at 23:39

## 2020-03-22 RX ADMIN — SODIUM CHLORIDE SCH MLS/HR: 9 INJECTION, SOLUTION INTRAVENOUS at 15:16

## 2020-03-22 RX ADMIN — LEVALBUTEROL SCH MG: 1.25 SOLUTION, CONCENTRATE RESPIRATORY (INHALATION) at 07:29

## 2020-03-22 RX ADMIN — RIVAROXABAN SCH MG: 10 TABLET, FILM COATED ORAL at 10:11

## 2020-03-22 RX ADMIN — DOCUSATE SODIUM LIQUID SCH MG: 100 LIQUID ORAL at 21:11

## 2020-03-22 RX ADMIN — ATORVASTATIN CALCIUM SCH MG: 20 TABLET, FILM COATED ORAL at 21:11

## 2020-03-22 RX ADMIN — DOCUSATE SODIUM LIQUID SCH MG: 100 LIQUID ORAL at 10:07

## 2020-03-22 RX ADMIN — SODIUM CHLORIDE SCH MLS/HR: 9 INJECTION, SOLUTION INTRAVENOUS at 10:07

## 2020-03-23 VITALS — SYSTOLIC BLOOD PRESSURE: 107 MMHG

## 2020-03-23 VITALS — SYSTOLIC BLOOD PRESSURE: 111 MMHG

## 2020-03-23 VITALS — SYSTOLIC BLOOD PRESSURE: 119 MMHG

## 2020-03-23 LAB
ANION GAP SERPL CALCULATED.3IONS-SCNC: 2 MMOL/L (ref 5–15)
BASOPHILS # BLD AUTO: 0 K/UL (ref 0–0.2)
BASOPHILS NFR BLD AUTO: 0.3 % (ref 0–2)
BUN SERPL-MCNC: 23 MG/DL (ref 8–21)
CALCIUM SERPL-MCNC: 7.9 MG/DL (ref 8.4–11)
CHLORIDE SERPL-SCNC: 108 MMOL/L (ref 98–107)
CREAT SERPL-MCNC: 0.55 MG/DL (ref 0.55–1.3)
EOSINOPHIL # BLD AUTO: 0 K/UL (ref 0–0.4)
EOSINOPHIL NFR BLD AUTO: 0.6 % (ref 0–4)
ERYTHROCYTE [DISTWIDTH] IN BLOOD BY AUTOMATED COUNT: 14.9 % (ref 9–15)
GFR SERPL CREATININE-BSD FRML MDRD: (no result) ML/MIN (ref 90–?)
GLUCOSE SERPL-MCNC: 112 MG/DL (ref 70–99)
HCT VFR BLD AUTO: 33.9 % (ref 36–54)
HGB BLD-MCNC: 11.2 G/DL (ref 14–18)
LYMPHOCYTES # BLD AUTO: 0.8 K/UL (ref 1–5.5)
LYMPHOCYTES NFR BLD AUTO: 14.2 % (ref 20.5–51.5)
MCH RBC QN AUTO: 33 PG (ref 27–31)
MCHC RBC AUTO-ENTMCNC: 33 % (ref 32–36)
MCV RBC AUTO: 100 FL (ref 79–98)
MONOCYTES # BLD MANUAL: 0.3 K/UL (ref 0–1)
MONOCYTES # BLD MANUAL: 5.2 % (ref 1.7–9.3)
NEUTROPHILS # BLD AUTO: 4.5 K/UL (ref 1.8–7.7)
NEUTROPHILS NFR BLD AUTO: 79.7 % (ref 40–70)
PLATELET # BLD AUTO: 122 K/UL (ref 130–430)
POTASSIUM SERPL-SCNC: 4.3 MMOL/L (ref 3.5–5.1)
RBC # BLD AUTO: 3.38 MIL/UL (ref 4.2–6.2)
SODIUM SERPLBLD-SCNC: 134 MMOL/L (ref 136–145)
WBC # BLD AUTO: 5.7 K/UL (ref 4.8–10.8)

## 2020-03-23 RX ADMIN — PREDNISONE SCH MG: 10 TABLET ORAL at 08:59

## 2020-03-23 RX ADMIN — RIVAROXABAN SCH MG: 10 TABLET, FILM COATED ORAL at 09:01

## 2020-03-23 RX ADMIN — DOCUSATE SODIUM LIQUID SCH MG: 100 LIQUID ORAL at 08:59

## 2020-03-23 RX ADMIN — Medication SCH MG: at 09:02

## 2020-03-23 RX ADMIN — SODIUM CHLORIDE SCH MLS/HR: 9 INJECTION, SOLUTION INTRAVENOUS at 14:47

## 2020-03-23 RX ADMIN — LEVALBUTEROL SCH MG: 1.25 SOLUTION, CONCENTRATE RESPIRATORY (INHALATION) at 07:36

## 2020-03-23 RX ADMIN — MUPIROCIN SCH APPLIC: 20 OINTMENT TOPICAL at 09:00

## 2020-03-23 RX ADMIN — FAMOTIDINE SCH MG: 20 TABLET, FILM COATED ORAL at 08:59

## 2020-03-23 RX ADMIN — SODIUM CHLORIDE SCH MLS/HR: 9 INJECTION, SOLUTION INTRAVENOUS at 04:30

## 2020-03-23 RX ADMIN — SODIUM CHLORIDE SCH MLS/HR: 9 INJECTION, SOLUTION INTRAVENOUS at 14:48

## 2020-03-23 RX ADMIN — SODIUM CHLORIDE SCH MLS/HR: 9 INJECTION, SOLUTION INTRAVENOUS at 06:10

## 2020-03-23 RX ADMIN — CLOTRIMAZOLE AND BETAMETHASONE DIPROPIONATE SCH GM: 10; .5 CREAM TOPICAL at 09:00

## 2020-04-18 ENCOUNTER — HOSPITAL ENCOUNTER (INPATIENT)
Dept: HOSPITAL 4 - SED | Age: 80
LOS: 5 days | Discharge: NURSING, REHAB OR LONG-TERM CARE | DRG: 720 | End: 2020-04-23
Attending: FAMILY MEDICINE | Admitting: FAMILY MEDICINE
Payer: COMMERCIAL

## 2020-04-18 VITALS — SYSTOLIC BLOOD PRESSURE: 117 MMHG

## 2020-04-18 VITALS — HEIGHT: 72 IN | BODY MASS INDEX: 20.18 KG/M2 | WEIGHT: 149 LBS

## 2020-04-18 VITALS — SYSTOLIC BLOOD PRESSURE: 127 MMHG

## 2020-04-18 DIAGNOSIS — E87.0: ICD-10-CM

## 2020-04-18 DIAGNOSIS — G93.41: ICD-10-CM

## 2020-04-18 DIAGNOSIS — I13.0: ICD-10-CM

## 2020-04-18 DIAGNOSIS — I48.91: ICD-10-CM

## 2020-04-18 DIAGNOSIS — N17.9: ICD-10-CM

## 2020-04-18 DIAGNOSIS — J96.21: ICD-10-CM

## 2020-04-18 DIAGNOSIS — Z88.8: ICD-10-CM

## 2020-04-18 DIAGNOSIS — E11.22: ICD-10-CM

## 2020-04-18 DIAGNOSIS — E86.0: ICD-10-CM

## 2020-04-18 DIAGNOSIS — Z86.718: ICD-10-CM

## 2020-04-18 DIAGNOSIS — Z20.828: ICD-10-CM

## 2020-04-18 DIAGNOSIS — E43: ICD-10-CM

## 2020-04-18 DIAGNOSIS — R65.20: ICD-10-CM

## 2020-04-18 DIAGNOSIS — J69.0: ICD-10-CM

## 2020-04-18 DIAGNOSIS — B96.4: ICD-10-CM

## 2020-04-18 DIAGNOSIS — A41.9: Primary | ICD-10-CM

## 2020-04-18 DIAGNOSIS — Z93.1: ICD-10-CM

## 2020-04-18 DIAGNOSIS — F03.90: ICD-10-CM

## 2020-04-18 DIAGNOSIS — N40.0: ICD-10-CM

## 2020-04-18 DIAGNOSIS — Z86.73: ICD-10-CM

## 2020-04-18 DIAGNOSIS — J44.9: ICD-10-CM

## 2020-04-18 DIAGNOSIS — Z66: ICD-10-CM

## 2020-04-18 DIAGNOSIS — N30.90: ICD-10-CM

## 2020-04-18 LAB
ALBUMIN SERPL BCP-MCNC: 2.7 G/DL (ref 3.4–4.8)
ALT SERPL W P-5'-P-CCNC: 122 U/L (ref 12–78)
AMPHETAMINES UR QL SCN: NEGATIVE
AMYLASE SERPL-CCNC: 32 U/L (ref 0–100)
ANION GAP SERPL CALCULATED.3IONS-SCNC: 7 MMOL/L (ref 5–15)
APAP SERPL-MCNC: < 1 UG/ML (ref 1–30)
APPEARANCE UR: (no result)
AST SERPL W P-5'-P-CCNC: 86 U/L (ref 10–37)
BACTERIA URNS QL MICRO: (no result) /HPF
BARBITURATES UR QL SCN: NEGATIVE
BASOPHILS # BLD AUTO: 0.1 K/UL (ref 0–0.2)
BASOPHILS NFR BLD AUTO: 0.4 % (ref 0–2)
BENZODIAZ UR QL SCN: NEGATIVE
BILIRUB SERPL-MCNC: 0.5 MG/DL (ref 0–1)
BILIRUB UR QL STRIP: NEGATIVE
BUN SERPL-MCNC: 67 MG/DL (ref 8–21)
BZE UR QL SCN: NEGATIVE
CALCIUM SERPL-MCNC: 8.6 MG/DL (ref 8.4–11)
CANNABINOIDS UR QL SCN: NEGATIVE
CHLORIDE SERPL-SCNC: 121 MMOL/L (ref 98–107)
COLOR UR: YELLOW
CREAT SERPL-MCNC: 1.32 MG/DL (ref 0.55–1.3)
EOSINOPHIL # BLD AUTO: 0 K/UL (ref 0–0.4)
EOSINOPHIL NFR BLD AUTO: 0.3 % (ref 0–4)
ERYTHROCYTE [DISTWIDTH] IN BLOOD BY AUTOMATED COUNT: 15.4 % (ref 9–15)
ETHANOL SERPL-MCNC: 3 MG/DL (ref ?–10)
GFR SERPL CREATININE-BSD FRML MDRD: (no result) ML/MIN (ref 90–?)
GLUCOSE SERPL-MCNC: 126 MG/DL (ref 70–99)
GLUCOSE UR STRIP-MCNC: NEGATIVE MG/DL
HCT VFR BLD AUTO: 47.9 % (ref 36–54)
HGB BLD-MCNC: 15.5 G/DL (ref 14–18)
HGB UR QL STRIP: (no result)
INR PPP: 1.2 (ref 0.8–1.2)
KETONES UR STRIP-MCNC: NEGATIVE MG/DL
LEUKOCYTE ESTERASE UR QL STRIP: (no result)
LIPASE SERPL-CCNC: 56 U/L (ref 73–393)
LYMPHOCYTES # BLD AUTO: 1.7 K/UL (ref 1–5.5)
LYMPHOCYTES NFR BLD AUTO: 13.2 % (ref 20.5–51.5)
MCH RBC QN AUTO: 32 PG (ref 27–31)
MCHC RBC AUTO-ENTMCNC: 32 % (ref 32–36)
MCV RBC AUTO: 100 FL (ref 79–98)
METHADONE UR-SCNC: NEGATIVE UMOL/L
METHAMPHET UR-SCNC: NEGATIVE UMOL/L
MONOCYTES # BLD MANUAL: 1.3 K/UL (ref 0–1)
MONOCYTES # BLD MANUAL: 9.7 % (ref 1.7–9.3)
NEUTROPHILS # BLD AUTO: 10.1 K/UL (ref 1.8–7.7)
NEUTROPHILS NFR BLD AUTO: 76.4 % (ref 40–70)
NITRITE UR QL STRIP: POSITIVE
OPIATES UR QL SCN: NEGATIVE
OXYCODONE SERPL-MCNC: NEGATIVE NG/ML
PCP UR QL SCN: NEGATIVE
PH UR STRIP: 8.5 [PH] (ref 5–8)
PLATELET # BLD AUTO: 207 K/UL (ref 130–430)
POTASSIUM SERPL-SCNC: 3.8 MMOL/L (ref 3.5–5.1)
PROT UR QL STRIP: (no result)
PROTHROMBIN TIME: 11.8 SECS (ref 9.5–12.5)
RBC # BLD AUTO: 4.79 MIL/UL (ref 4.2–6.2)
RBC #/AREA URNS HPF: (no result) /HPF (ref 0–3)
SODIUM SERPLBLD-SCNC: 160 MMOL/L (ref 136–145)
SP GR UR STRIP: 1.01 (ref 1–1.03)
TRICYCLICS UR-MCNC: NEGATIVE NG/ML
URINE PROPOXYPHENE SCREEN: NEGATIVE
UROBILINOGEN UR STRIP-MCNC: 0.2 MG/DL (ref 0.2–1)
WBC # BLD AUTO: 13.2 K/UL (ref 4.8–10.8)
WBC #/AREA URNS HPF: >100 /HPF (ref 0–3)

## 2020-04-18 PROCEDURE — G0378 HOSPITAL OBSERVATION PER HR: HCPCS

## 2020-04-18 PROCEDURE — U0002 COVID-19 LAB TEST NON-CDC: HCPCS

## 2020-04-18 PROCEDURE — G0482 DRUG TEST DEF 15-21 CLASSES: HCPCS

## 2020-04-18 PROCEDURE — G0480 DRUG TEST DEF 1-7 CLASSES: HCPCS

## 2020-04-18 PROCEDURE — G0481 DRUG TEST DEF 8-14 CLASSES: HCPCS

## 2020-04-18 RX ADMIN — Medication SCH MG: at 23:05

## 2020-04-18 RX ADMIN — FAMOTIDINE SCH MG: 20 TABLET, FILM COATED ORAL at 23:04

## 2020-04-18 RX ADMIN — Medication SCH EA: at 23:49

## 2020-04-18 RX ADMIN — MEROPENEM AND SODIUM CHLORIDE SCH MLS/HR: 500 INJECTION, SOLUTION INTRAVENOUS at 23:07

## 2020-04-18 RX ADMIN — Medication SCH EA: at 23:48

## 2020-04-19 VITALS — SYSTOLIC BLOOD PRESSURE: 108 MMHG

## 2020-04-19 VITALS — SYSTOLIC BLOOD PRESSURE: 98 MMHG

## 2020-04-19 VITALS — SYSTOLIC BLOOD PRESSURE: 99 MMHG

## 2020-04-19 VITALS — SYSTOLIC BLOOD PRESSURE: 112 MMHG

## 2020-04-19 VITALS — SYSTOLIC BLOOD PRESSURE: 91 MMHG

## 2020-04-19 LAB
ANION GAP SERPL CALCULATED.3IONS-SCNC: 10 MMOL/L (ref 5–15)
BASOPHILS # BLD AUTO: 0 K/UL (ref 0–0.2)
BASOPHILS NFR BLD AUTO: 0.2 % (ref 0–2)
BUN SERPL-MCNC: 61 MG/DL (ref 8–21)
CALCIUM SERPL-MCNC: 7.6 MG/DL (ref 8.4–11)
CHLORIDE SERPL-SCNC: 125 MMOL/L (ref 98–107)
CREAT SERPL-MCNC: 1.05 MG/DL (ref 0.55–1.3)
EOSINOPHIL # BLD AUTO: 0 K/UL (ref 0–0.4)
EOSINOPHIL NFR BLD AUTO: 0 % (ref 0–4)
ERYTHROCYTE [DISTWIDTH] IN BLOOD BY AUTOMATED COUNT: 15.3 % (ref 9–15)
GFR SERPL CREATININE-BSD FRML MDRD: (no result) ML/MIN (ref 90–?)
GLUCOSE SERPL-MCNC: 236 MG/DL (ref 70–99)
HCT VFR BLD AUTO: 41.7 % (ref 36–54)
HGB BLD-MCNC: 13.2 G/DL (ref 14–18)
LYMPHOCYTES # BLD AUTO: 0.7 K/UL (ref 1–5.5)
LYMPHOCYTES NFR BLD AUTO: 8.3 % (ref 20.5–51.5)
MCH RBC QN AUTO: 32 PG (ref 27–31)
MCHC RBC AUTO-ENTMCNC: 32 % (ref 32–36)
MCV RBC AUTO: 101 FL (ref 79–98)
MONOCYTES # BLD MANUAL: 0.2 K/UL (ref 0–1)
MONOCYTES # BLD MANUAL: 2.4 % (ref 1.7–9.3)
NEUTROPHILS # BLD AUTO: 7.7 K/UL (ref 1.8–7.7)
NEUTROPHILS NFR BLD AUTO: 89.1 % (ref 40–70)
PLATELET # BLD AUTO: 171 K/UL (ref 130–430)
POTASSIUM SERPL-SCNC: 3.3 MMOL/L (ref 3.5–5.1)
RBC # BLD AUTO: 4.14 MIL/UL (ref 4.2–6.2)
SODIUM SERPLBLD-SCNC: 161 MMOL/L (ref 136–145)
WBC # BLD AUTO: 8.6 K/UL (ref 4.8–10.8)

## 2020-04-19 RX ADMIN — OXYCODONE HYDROCHLORIDE AND ACETAMINOPHEN SCH MG: 500 TABLET ORAL at 09:25

## 2020-04-19 RX ADMIN — LEVALBUTEROL SCH MG: 1.25 SOLUTION, CONCENTRATE RESPIRATORY (INHALATION) at 16:17

## 2020-04-19 RX ADMIN — ATORVASTATIN CALCIUM SCH MG: 20 TABLET, FILM COATED ORAL at 20:52

## 2020-04-19 RX ADMIN — Medication SCH EA: at 06:58

## 2020-04-19 RX ADMIN — POTASSIUM CHLORIDE AND SODIUM CHLORIDE SCH MLS/HR: 450; 150 INJECTION, SOLUTION INTRAVENOUS at 05:15

## 2020-04-19 RX ADMIN — Medication SCH EA: at 17:25

## 2020-04-19 RX ADMIN — MEROPENEM AND SODIUM CHLORIDE SCH MLS/HR: 500 INJECTION, SOLUTION INTRAVENOUS at 20:52

## 2020-04-19 RX ADMIN — POTASSIUM CHLORIDE AND SODIUM CHLORIDE SCH MLS/HR: 450; 150 INJECTION, SOLUTION INTRAVENOUS at 17:24

## 2020-04-19 RX ADMIN — MEROPENEM AND SODIUM CHLORIDE SCH MLS/HR: 500 INJECTION, SOLUTION INTRAVENOUS at 14:42

## 2020-04-19 RX ADMIN — DOCUSATE SODIUM SCH MG: 100 CAPSULE, LIQUID FILLED ORAL at 09:25

## 2020-04-19 RX ADMIN — RIVAROXABAN SCH MG: 10 TABLET, FILM COATED ORAL at 09:25

## 2020-04-19 RX ADMIN — HUMAN INSULIN PRN UNITS: 100 INJECTION, SOLUTION SUBCUTANEOUS at 07:00

## 2020-04-19 RX ADMIN — Medication SCH EA: at 12:12

## 2020-04-19 RX ADMIN — SODIUM CHLORIDE SCH MLS/HR: 9 INJECTION, SOLUTION INTRAVENOUS at 11:26

## 2020-04-19 RX ADMIN — MULTIVITAMIN SCH ML: LIQUID ORAL at 09:25

## 2020-04-19 RX ADMIN — Medication SCH MG: at 09:25

## 2020-04-19 RX ADMIN — HUMAN INSULIN PRN UNITS: 100 INJECTION, SOLUTION SUBCUTANEOUS at 00:38

## 2020-04-19 RX ADMIN — MEROPENEM AND SODIUM CHLORIDE SCH MLS/HR: 500 INJECTION, SOLUTION INTRAVENOUS at 05:16

## 2020-04-19 RX ADMIN — DOCUSATE SODIUM SCH MG: 100 CAPSULE, LIQUID FILLED ORAL at 20:52

## 2020-04-19 RX ADMIN — FAMOTIDINE SCH MG: 20 TABLET, FILM COATED ORAL at 09:25

## 2020-04-20 VITALS — SYSTOLIC BLOOD PRESSURE: 140 MMHG

## 2020-04-20 VITALS — SYSTOLIC BLOOD PRESSURE: 112 MMHG

## 2020-04-20 VITALS — SYSTOLIC BLOOD PRESSURE: 120 MMHG

## 2020-04-20 VITALS — SYSTOLIC BLOOD PRESSURE: 116 MMHG

## 2020-04-20 LAB
ANION GAP SERPL CALCULATED.3IONS-SCNC: 9 MMOL/L (ref 5–15)
BASOPHILS # BLD AUTO: 0 K/UL (ref 0–0.2)
BASOPHILS NFR BLD AUTO: 0.2 % (ref 0–2)
BUN SERPL-MCNC: 56 MG/DL (ref 8–21)
CALCIUM SERPL-MCNC: 8.2 MG/DL (ref 8.4–11)
CHLORIDE SERPL-SCNC: 123 MMOL/L (ref 98–107)
CREAT SERPL-MCNC: 1.02 MG/DL (ref 0.55–1.3)
EOSINOPHIL # BLD AUTO: 0 K/UL (ref 0–0.4)
EOSINOPHIL NFR BLD AUTO: 0 % (ref 0–4)
ERYTHROCYTE [DISTWIDTH] IN BLOOD BY AUTOMATED COUNT: 15.9 % (ref 9–15)
GFR SERPL CREATININE-BSD FRML MDRD: (no result) ML/MIN (ref 90–?)
GLUCOSE SERPL-MCNC: 154 MG/DL (ref 70–99)
HCT VFR BLD AUTO: 41.7 % (ref 36–54)
HGB BLD-MCNC: 12.8 G/DL (ref 14–18)
LYMPHOCYTES # BLD AUTO: 0.9 K/UL (ref 1–5.5)
LYMPHOCYTES NFR BLD AUTO: 7 % (ref 20.5–51.5)
MCH RBC QN AUTO: 32 PG (ref 27–31)
MCHC RBC AUTO-ENTMCNC: 31 % (ref 32–36)
MCV RBC AUTO: 104 FL (ref 79–98)
MONOCYTES # BLD MANUAL: 0.3 K/UL (ref 0–1)
MONOCYTES # BLD MANUAL: 2.5 % (ref 1.7–9.3)
NEUTROPHILS # BLD AUTO: 11.2 K/UL (ref 1.8–7.7)
NEUTROPHILS NFR BLD AUTO: 90.3 % (ref 40–70)
PLATELET # BLD AUTO: 146 K/UL (ref 130–430)
POTASSIUM SERPL-SCNC: 4.8 MMOL/L (ref 3.5–5.1)
RBC # BLD AUTO: 4 MIL/UL (ref 4.2–6.2)
SODIUM SERPLBLD-SCNC: 156 MMOL/L (ref 136–145)
WBC # BLD AUTO: 12.3 K/UL (ref 4.8–10.8)

## 2020-04-20 RX ADMIN — DEXTROSE MONOHYDRATE SCH MLS/HR: 50 INJECTION, SOLUTION INTRAVENOUS at 14:12

## 2020-04-20 RX ADMIN — RIVAROXABAN SCH MG: 10 TABLET, FILM COATED ORAL at 09:38

## 2020-04-20 RX ADMIN — POTASSIUM CHLORIDE AND SODIUM CHLORIDE SCH MLS/HR: 450; 150 INJECTION, SOLUTION INTRAVENOUS at 23:26

## 2020-04-20 RX ADMIN — Medication SCH EA: at 17:51

## 2020-04-20 RX ADMIN — FAMOTIDINE SCH MG: 20 TABLET, FILM COATED ORAL at 09:35

## 2020-04-20 RX ADMIN — Medication SCH EA: at 12:08

## 2020-04-20 RX ADMIN — MEROPENEM AND SODIUM CHLORIDE SCH MLS/HR: 500 INJECTION, SOLUTION INTRAVENOUS at 05:08

## 2020-04-20 RX ADMIN — BISACODYL ONE MG: 10 SUPPOSITORY RECTAL at 15:02

## 2020-04-20 RX ADMIN — DOCUSATE SODIUM SCH MG: 100 CAPSULE, LIQUID FILLED ORAL at 20:28

## 2020-04-20 RX ADMIN — OXYCODONE HYDROCHLORIDE AND ACETAMINOPHEN SCH MG: 500 TABLET ORAL at 09:35

## 2020-04-20 RX ADMIN — DOCUSATE SODIUM SCH MG: 100 CAPSULE, LIQUID FILLED ORAL at 09:00

## 2020-04-20 RX ADMIN — SODIUM CHLORIDE SCH MLS/HR: 9 INJECTION, SOLUTION INTRAVENOUS at 12:41

## 2020-04-20 RX ADMIN — Medication SCH EA: at 00:14

## 2020-04-20 RX ADMIN — ATORVASTATIN CALCIUM SCH MG: 20 TABLET, FILM COATED ORAL at 20:28

## 2020-04-20 RX ADMIN — MUPIROCIN SCH GM: 20 OINTMENT TOPICAL at 20:27

## 2020-04-20 RX ADMIN — Medication SCH EA: at 05:15

## 2020-04-20 RX ADMIN — Medication SCH MG: at 09:39

## 2020-04-20 RX ADMIN — MUPIROCIN SCH GM: 20 OINTMENT TOPICAL at 20:41

## 2020-04-20 RX ADMIN — POTASSIUM CHLORIDE AND SODIUM CHLORIDE SCH MLS/HR: 450; 150 INJECTION, SOLUTION INTRAVENOUS at 02:00

## 2020-04-20 RX ADMIN — POTASSIUM CHLORIDE AND SODIUM CHLORIDE SCH MLS/HR: 450; 150 INJECTION, SOLUTION INTRAVENOUS at 05:08

## 2020-04-20 RX ADMIN — Medication SCH EA: at 23:27

## 2020-04-20 RX ADMIN — MULTIVITAMIN SCH ML: LIQUID ORAL at 12:40

## 2020-04-20 RX ADMIN — BISACODYL ONE MG: 10 SUPPOSITORY RECTAL at 15:27

## 2020-04-21 VITALS — SYSTOLIC BLOOD PRESSURE: 115 MMHG

## 2020-04-21 VITALS — SYSTOLIC BLOOD PRESSURE: 129 MMHG

## 2020-04-21 RX ADMIN — Medication SCH EA: at 18:22

## 2020-04-21 RX ADMIN — DOCUSATE SODIUM SCH MG: 100 CAPSULE, LIQUID FILLED ORAL at 10:16

## 2020-04-21 RX ADMIN — Medication SCH EA: at 05:59

## 2020-04-21 RX ADMIN — MUPIROCIN SCH GM: 20 OINTMENT TOPICAL at 10:16

## 2020-04-21 RX ADMIN — FAMOTIDINE SCH MG: 20 TABLET, FILM COATED ORAL at 10:16

## 2020-04-21 RX ADMIN — DOCUSATE SODIUM SCH MG: 100 CAPSULE, LIQUID FILLED ORAL at 21:18

## 2020-04-21 RX ADMIN — MUPIROCIN SCH GM: 20 OINTMENT TOPICAL at 21:19

## 2020-04-21 RX ADMIN — Medication SCH MG: at 10:19

## 2020-04-21 RX ADMIN — Medication SCH EA: at 12:04

## 2020-04-21 RX ADMIN — SODIUM CHLORIDE SCH MLS/HR: 9 INJECTION, SOLUTION INTRAVENOUS at 18:22

## 2020-04-21 RX ADMIN — RIVAROXABAN SCH MG: 10 TABLET, FILM COATED ORAL at 10:18

## 2020-04-21 RX ADMIN — SODIUM CHLORIDE SCH MLS/HR: 9 INJECTION, SOLUTION INTRAVENOUS at 12:05

## 2020-04-21 RX ADMIN — OXYCODONE HYDROCHLORIDE AND ACETAMINOPHEN SCH MG: 500 TABLET ORAL at 10:16

## 2020-04-21 RX ADMIN — POTASSIUM CHLORIDE AND SODIUM CHLORIDE SCH MLS/HR: 450; 150 INJECTION, SOLUTION INTRAVENOUS at 21:17

## 2020-04-21 RX ADMIN — MULTIVITAMIN SCH ML: LIQUID ORAL at 10:24

## 2020-04-21 RX ADMIN — ATORVASTATIN CALCIUM SCH MG: 20 TABLET, FILM COATED ORAL at 21:18

## 2020-04-21 RX ADMIN — DEXTROSE MONOHYDRATE SCH MLS/HR: 50 INJECTION, SOLUTION INTRAVENOUS at 15:12

## 2020-04-21 RX ADMIN — POTASSIUM CHLORIDE AND SODIUM CHLORIDE SCH MLS/HR: 450; 150 INJECTION, SOLUTION INTRAVENOUS at 10:33

## 2020-04-22 VITALS — SYSTOLIC BLOOD PRESSURE: 132 MMHG

## 2020-04-22 VITALS — SYSTOLIC BLOOD PRESSURE: 139 MMHG

## 2020-04-22 VITALS — SYSTOLIC BLOOD PRESSURE: 128 MMHG

## 2020-04-22 VITALS — SYSTOLIC BLOOD PRESSURE: 133 MMHG

## 2020-04-22 VITALS — SYSTOLIC BLOOD PRESSURE: 138 MMHG

## 2020-04-22 RX ADMIN — MUPIROCIN SCH GM: 20 OINTMENT TOPICAL at 09:15

## 2020-04-22 RX ADMIN — LEVALBUTEROL TARTRATE SCH GM: 45 AEROSOL, METERED ORAL at 15:00

## 2020-04-22 RX ADMIN — ATORVASTATIN CALCIUM SCH MG: 20 TABLET, FILM COATED ORAL at 20:13

## 2020-04-22 RX ADMIN — POTASSIUM CHLORIDE AND SODIUM CHLORIDE SCH MLS/HR: 450; 150 INJECTION, SOLUTION INTRAVENOUS at 15:00

## 2020-04-22 RX ADMIN — LEVALBUTEROL TARTRATE SCH GM: 45 AEROSOL, METERED ORAL at 11:00

## 2020-04-22 RX ADMIN — FAMOTIDINE SCH MG: 20 TABLET, FILM COATED ORAL at 09:11

## 2020-04-22 RX ADMIN — OXYCODONE HYDROCHLORIDE AND ACETAMINOPHEN SCH MG: 500 TABLET ORAL at 09:11

## 2020-04-22 RX ADMIN — SODIUM CHLORIDE SCH MLS/HR: 9 INJECTION, SOLUTION INTRAVENOUS at 05:47

## 2020-04-22 RX ADMIN — Medication SCH EA: at 17:10

## 2020-04-22 RX ADMIN — DOCUSATE SODIUM SCH MG: 100 CAPSULE, LIQUID FILLED ORAL at 09:11

## 2020-04-22 RX ADMIN — SODIUM CHLORIDE SCH MLS/HR: 9 INJECTION, SOLUTION INTRAVENOUS at 11:59

## 2020-04-22 RX ADMIN — SODIUM CHLORIDE SCH MLS/HR: 9 INJECTION, SOLUTION INTRAVENOUS at 17:04

## 2020-04-22 RX ADMIN — RIVAROXABAN SCH MG: 10 TABLET, FILM COATED ORAL at 09:10

## 2020-04-22 RX ADMIN — Medication SCH EA: at 12:10

## 2020-04-22 RX ADMIN — Medication SCH EA: at 23:49

## 2020-04-22 RX ADMIN — DOCUSATE SODIUM SCH MG: 100 CAPSULE, LIQUID FILLED ORAL at 20:13

## 2020-04-22 RX ADMIN — MUPIROCIN SCH GM: 20 OINTMENT TOPICAL at 20:14

## 2020-04-22 RX ADMIN — Medication SCH MG: at 09:11

## 2020-04-22 RX ADMIN — Medication SCH EA: at 05:49

## 2020-04-22 RX ADMIN — LEVALBUTEROL TARTRATE SCH GM: 45 AEROSOL, METERED ORAL at 07:00

## 2020-04-22 RX ADMIN — SODIUM CHLORIDE SCH MLS/HR: 9 INJECTION, SOLUTION INTRAVENOUS at 23:46

## 2020-04-22 RX ADMIN — POTASSIUM CHLORIDE AND SODIUM CHLORIDE SCH MLS/HR: 450; 150 INJECTION, SOLUTION INTRAVENOUS at 05:46

## 2020-04-22 RX ADMIN — LEVALBUTEROL TARTRATE SCH GM: 45 AEROSOL, METERED ORAL at 19:00

## 2020-04-22 RX ADMIN — SODIUM CHLORIDE SCH MLS/HR: 9 INJECTION, SOLUTION INTRAVENOUS at 00:23

## 2020-04-22 RX ADMIN — LEVALBUTEROL TARTRATE SCH GM: 45 AEROSOL, METERED ORAL at 23:00

## 2020-04-22 RX ADMIN — MULTIVITAMIN SCH ML: LIQUID ORAL at 09:12

## 2020-04-22 RX ADMIN — Medication SCH EA: at 00:25

## 2020-04-23 VITALS — SYSTOLIC BLOOD PRESSURE: 135 MMHG

## 2020-04-23 VITALS — SYSTOLIC BLOOD PRESSURE: 124 MMHG

## 2020-04-23 VITALS — SYSTOLIC BLOOD PRESSURE: 134 MMHG

## 2020-04-23 VITALS — SYSTOLIC BLOOD PRESSURE: 137 MMHG

## 2020-04-23 VITALS — SYSTOLIC BLOOD PRESSURE: 146 MMHG

## 2020-04-23 LAB
ANION GAP SERPL CALCULATED.3IONS-SCNC: 6 MMOL/L (ref 5–15)
BASOPHILS # BLD AUTO: 0 K/UL (ref 0–0.2)
BASOPHILS NFR BLD AUTO: 0.1 % (ref 0–2)
BUN SERPL-MCNC: 38 MG/DL (ref 8–21)
CALCIUM SERPL-MCNC: 7.5 MG/DL (ref 8.4–11)
CHLORIDE SERPL-SCNC: 117 MMOL/L (ref 98–107)
CREAT SERPL-MCNC: 0.78 MG/DL (ref 0.55–1.3)
EOSINOPHIL # BLD AUTO: 0 K/UL (ref 0–0.4)
EOSINOPHIL NFR BLD AUTO: 0 % (ref 0–4)
ERYTHROCYTE [DISTWIDTH] IN BLOOD BY AUTOMATED COUNT: 14.7 % (ref 9–15)
GFR SERPL CREATININE-BSD FRML MDRD: (no result) ML/MIN (ref 90–?)
GLUCOSE SERPL-MCNC: 136 MG/DL (ref 70–99)
HCT VFR BLD AUTO: 40.1 % (ref 36–54)
HGB BLD-MCNC: 13 G/DL (ref 14–18)
LYMPHOCYTES # BLD AUTO: 0.6 K/UL (ref 1–5.5)
LYMPHOCYTES NFR BLD AUTO: 7.5 % (ref 20.5–51.5)
MCH RBC QN AUTO: 32 PG (ref 27–31)
MCHC RBC AUTO-ENTMCNC: 32 % (ref 32–36)
MCV RBC AUTO: 100 FL (ref 79–98)
MONOCYTES # BLD MANUAL: 0.3 K/UL (ref 0–1)
MONOCYTES # BLD MANUAL: 3.7 % (ref 1.7–9.3)
NEUTROPHILS # BLD AUTO: 7.1 K/UL (ref 1.8–7.7)
NEUTROPHILS NFR BLD AUTO: 88.7 % (ref 40–70)
PLATELET # BLD AUTO: 150 K/UL (ref 130–430)
POTASSIUM SERPL-SCNC: 4.5 MMOL/L (ref 3.5–5.1)
RBC # BLD AUTO: 4.02 MIL/UL (ref 4.2–6.2)
SODIUM SERPLBLD-SCNC: 149 MMOL/L (ref 136–145)
WBC # BLD AUTO: 8 K/UL (ref 4.8–10.8)

## 2020-04-23 RX ADMIN — POTASSIUM CHLORIDE AND SODIUM CHLORIDE SCH MLS/HR: 450; 150 INJECTION, SOLUTION INTRAVENOUS at 01:49

## 2020-04-23 RX ADMIN — Medication SCH EA: at 17:06

## 2020-04-23 RX ADMIN — MULTIVITAMIN SCH ML: LIQUID ORAL at 09:22

## 2020-04-23 RX ADMIN — LEVALBUTEROL SCH MG: 1.25 SOLUTION, CONCENTRATE RESPIRATORY (INHALATION) at 12:19

## 2020-04-23 RX ADMIN — FAMOTIDINE SCH MG: 20 TABLET, FILM COATED ORAL at 09:23

## 2020-04-23 RX ADMIN — DOCUSATE SODIUM SCH MG: 100 CAPSULE, LIQUID FILLED ORAL at 09:23

## 2020-04-23 RX ADMIN — LEVALBUTEROL TARTRATE SCH GM: 45 AEROSOL, METERED ORAL at 15:00

## 2020-04-23 RX ADMIN — SODIUM CHLORIDE SCH MLS/HR: 9 INJECTION, SOLUTION INTRAVENOUS at 17:05

## 2020-04-23 RX ADMIN — Medication SCH EA: at 05:13

## 2020-04-23 RX ADMIN — LEVALBUTEROL TARTRATE SCH GM: 45 AEROSOL, METERED ORAL at 10:35

## 2020-04-23 RX ADMIN — LEVALBUTEROL TARTRATE SCH GM: 45 AEROSOL, METERED ORAL at 07:00

## 2020-04-23 RX ADMIN — OXYCODONE HYDROCHLORIDE AND ACETAMINOPHEN SCH MG: 500 TABLET ORAL at 09:23

## 2020-04-23 RX ADMIN — SODIUM CHLORIDE SCH MLS/HR: 9 INJECTION, SOLUTION INTRAVENOUS at 12:04

## 2020-04-23 RX ADMIN — MUPIROCIN SCH GM: 20 OINTMENT TOPICAL at 09:23

## 2020-04-23 RX ADMIN — Medication SCH EA: at 12:09

## 2020-04-23 RX ADMIN — Medication SCH MG: at 09:24

## 2020-04-23 RX ADMIN — SODIUM CHLORIDE SCH MLS/HR: 9 INJECTION, SOLUTION INTRAVENOUS at 05:10

## 2020-04-23 RX ADMIN — RIVAROXABAN SCH MG: 10 TABLET, FILM COATED ORAL at 09:24

## 2020-04-23 RX ADMIN — LEVALBUTEROL TARTRATE SCH GM: 45 AEROSOL, METERED ORAL at 03:00

## 2020-04-23 RX ADMIN — POTASSIUM CHLORIDE AND SODIUM CHLORIDE SCH MLS/HR: 450; 150 INJECTION, SOLUTION INTRAVENOUS at 12:05
